# Patient Record
Sex: MALE | Race: WHITE | URBAN - METROPOLITAN AREA
[De-identification: names, ages, dates, MRNs, and addresses within clinical notes are randomized per-mention and may not be internally consistent; named-entity substitution may affect disease eponyms.]

---

## 2018-01-01 ENCOUNTER — ANESTHESIA EVENT (OUTPATIENT)
Dept: CARDIAC CATH/INVASIVE PROCEDURES | Age: 58
DRG: 252 | End: 2018-01-01
Payer: COMMERCIAL

## 2018-01-01 ENCOUNTER — OFFICE VISIT (OUTPATIENT)
Dept: PRIMARY CARE CLINIC | Age: 58
End: 2018-01-01
Payer: COMMERCIAL

## 2018-01-01 ENCOUNTER — ANESTHESIA EVENT (OUTPATIENT)
Dept: OPERATING ROOM | Age: 58
DRG: 252 | End: 2018-01-01
Payer: COMMERCIAL

## 2018-01-01 ENCOUNTER — APPOINTMENT (OUTPATIENT)
Dept: CT IMAGING | Age: 58
DRG: 252 | End: 2018-01-01
Payer: COMMERCIAL

## 2018-01-01 ENCOUNTER — APPOINTMENT (OUTPATIENT)
Dept: CARDIAC CATH/INVASIVE PROCEDURES | Age: 58
DRG: 252 | End: 2018-01-01
Payer: COMMERCIAL

## 2018-01-01 ENCOUNTER — APPOINTMENT (OUTPATIENT)
Dept: GENERAL RADIOLOGY | Age: 58
DRG: 252 | End: 2018-01-01
Payer: COMMERCIAL

## 2018-01-01 ENCOUNTER — HOSPITAL ENCOUNTER (INPATIENT)
Age: 58
LOS: 2 days | DRG: 252 | End: 2018-09-16
Attending: EMERGENCY MEDICINE | Admitting: INTERNAL MEDICINE
Payer: COMMERCIAL

## 2018-01-01 ENCOUNTER — APPOINTMENT (OUTPATIENT)
Dept: ULTRASOUND IMAGING | Age: 58
DRG: 252 | End: 2018-01-01
Payer: COMMERCIAL

## 2018-01-01 ENCOUNTER — ANESTHESIA (OUTPATIENT)
Dept: CARDIAC CATH/INVASIVE PROCEDURES | Age: 58
DRG: 252 | End: 2018-01-01
Payer: COMMERCIAL

## 2018-01-01 ENCOUNTER — ANESTHESIA (OUTPATIENT)
Dept: OPERATING ROOM | Age: 58
DRG: 252 | End: 2018-01-01
Payer: COMMERCIAL

## 2018-01-01 VITALS
HEIGHT: 72 IN | HEART RATE: 119 BPM | OXYGEN SATURATION: 77 % | TEMPERATURE: 99 F | RESPIRATION RATE: 19 BRPM | BODY MASS INDEX: 38.64 KG/M2 | WEIGHT: 285.27 LBS | DIASTOLIC BLOOD PRESSURE: 74 MMHG | SYSTOLIC BLOOD PRESSURE: 103 MMHG

## 2018-01-01 VITALS
OXYGEN SATURATION: 100 % | RESPIRATION RATE: 12 BRPM | DIASTOLIC BLOOD PRESSURE: 83 MMHG | TEMPERATURE: 98.9 F | SYSTOLIC BLOOD PRESSURE: 128 MMHG

## 2018-01-01 VITALS
DIASTOLIC BLOOD PRESSURE: 80 MMHG | WEIGHT: 269 LBS | BODY MASS INDEX: 36.44 KG/M2 | OXYGEN SATURATION: 96 % | HEART RATE: 82 BPM | SYSTOLIC BLOOD PRESSURE: 142 MMHG | HEIGHT: 72 IN

## 2018-01-01 VITALS — OXYGEN SATURATION: 97 % | DIASTOLIC BLOOD PRESSURE: 71 MMHG | TEMPERATURE: 98.5 F | SYSTOLIC BLOOD PRESSURE: 118 MMHG

## 2018-01-01 DIAGNOSIS — I99.8 ISCHEMIA OF LEFT LOWER EXTREMITY: Primary | ICD-10-CM

## 2018-01-01 DIAGNOSIS — I70.90 ARTERIAL OCCLUSION: ICD-10-CM

## 2018-01-01 DIAGNOSIS — M54.31 RIGHT SIDED SCIATICA: Primary | ICD-10-CM

## 2018-01-01 LAB
-: ABNORMAL
-: NORMAL
ABSOLUTE EOS #: 0 K/UL (ref 0–0.4)
ABSOLUTE EOS #: 0 K/UL (ref 0–0.4)
ABSOLUTE IMMATURE GRANULOCYTE: 0 K/UL (ref 0–0.3)
ABSOLUTE IMMATURE GRANULOCYTE: 0.81 K/UL (ref 0–0.3)
ABSOLUTE LYMPH #: 2.85 K/UL (ref 1–4.8)
ABSOLUTE LYMPH #: 3.65 K/UL (ref 1–4.8)
ABSOLUTE MONO #: 2.14 K/UL (ref 0.1–0.8)
ABSOLUTE MONO #: 4.46 K/UL (ref 0.1–0.8)
ACTION: NORMAL
ALBUMIN SERPL-MCNC: 1.3 G/DL (ref 3.5–5.2)
ALBUMIN SERPL-MCNC: 1.6 G/DL (ref 3.5–5.2)
ALBUMIN SERPL-MCNC: 1.7 G/DL (ref 3.5–5.2)
ALBUMIN SERPL-MCNC: 2.8 G/DL (ref 3.5–5.2)
ALBUMIN/GLOBULIN RATIO: 0.7 (ref 1–2.5)
ALBUMIN/GLOBULIN RATIO: 0.8 (ref 1–2.5)
ALBUMIN/GLOBULIN RATIO: 0.9 (ref 1–2.5)
ALBUMIN/GLOBULIN RATIO: 1.1 (ref 1–2.5)
ALLEN TEST: ABNORMAL
ALLEN TEST: NORMAL
ALLEN TEST: POSITIVE
ALP BLD-CCNC: 158 U/L (ref 40–129)
ALP BLD-CCNC: 49 U/L (ref 40–129)
ALP BLD-CCNC: 65 U/L (ref 40–129)
ALP BLD-CCNC: 68 U/L (ref 40–129)
ALT SERPL-CCNC: 4508 U/L (ref 5–41)
ALT SERPL-CCNC: 468 U/L (ref 5–41)
ALT SERPL-CCNC: 570 U/L (ref 5–41)
ALT SERPL-CCNC: 96 U/L (ref 5–41)
AMORPHOUS: ABNORMAL
AMORPHOUS: NORMAL
ANION GAP SERPL CALCULATED.3IONS-SCNC: 12 MMOL/L (ref 9–17)
ANION GAP SERPL CALCULATED.3IONS-SCNC: 13 MMOL/L (ref 9–17)
ANION GAP SERPL CALCULATED.3IONS-SCNC: 15 MMOL/L (ref 9–17)
ANION GAP SERPL CALCULATED.3IONS-SCNC: 16 MMOL/L (ref 9–17)
ANION GAP SERPL CALCULATED.3IONS-SCNC: 17 MMOL/L (ref 9–17)
ANION GAP SERPL CALCULATED.3IONS-SCNC: 21 MMOL/L (ref 9–17)
ANION GAP SERPL CALCULATED.3IONS-SCNC: 21 MMOL/L (ref 9–17)
ANION GAP SERPL CALCULATED.3IONS-SCNC: 22 MMOL/L (ref 9–17)
ANION GAP SERPL CALCULATED.3IONS-SCNC: 22 MMOL/L (ref 9–17)
ANION GAP SERPL CALCULATED.3IONS-SCNC: 26 MMOL/L (ref 9–17)
ANION GAP: 14 MMOL/L (ref 7–16)
ANION GAP: 14 MMOL/L (ref 7–16)
ANION GAP: 16 MMOL/L (ref 7–16)
ANION GAP: 17 MMOL/L (ref 7–16)
AST SERPL-CCNC: 1056 U/L
AST SERPL-CCNC: 1356 U/L
AST SERPL-CCNC: 52 U/L
AST SERPL-CCNC: >7000 U/L
BACTERIA: ABNORMAL
BACTERIA: NORMAL
BASOPHILS # BLD: 0 % (ref 0–2)
BASOPHILS # BLD: 1 % (ref 0–2)
BASOPHILS ABSOLUTE: 0 K/UL (ref 0–0.2)
BASOPHILS ABSOLUTE: 0.36 K/UL (ref 0–0.2)
BILIRUB SERPL-MCNC: 0.41 MG/DL (ref 0.3–1.2)
BILIRUB SERPL-MCNC: 0.91 MG/DL (ref 0.3–1.2)
BILIRUB SERPL-MCNC: 0.96 MG/DL (ref 0.3–1.2)
BILIRUB SERPL-MCNC: 1.41 MG/DL (ref 0.3–1.2)
BILIRUBIN DIRECT: 0.72 MG/DL
BILIRUBIN URINE: NEGATIVE
BILIRUBIN URINE: NEGATIVE
BILIRUBIN, INDIRECT: 0.24 MG/DL (ref 0–1)
BUN BLDV-MCNC: 20 MG/DL (ref 6–20)
BUN BLDV-MCNC: 22 MG/DL (ref 6–20)
BUN BLDV-MCNC: 23 MG/DL (ref 6–20)
BUN BLDV-MCNC: 30 MG/DL (ref 6–20)
BUN BLDV-MCNC: 34 MG/DL (ref 6–20)
BUN BLDV-MCNC: 35 MG/DL (ref 6–20)
BUN BLDV-MCNC: 38 MG/DL (ref 6–20)
BUN BLDV-MCNC: 38 MG/DL (ref 6–20)
BUN BLDV-MCNC: 40 MG/DL (ref 6–20)
BUN BLDV-MCNC: 40 MG/DL (ref 6–20)
BUN/CREAT BLD: ABNORMAL (ref 9–20)
CALCIUM IONIZED: 0.91 MMOL/L (ref 1.13–1.33)
CALCIUM IONIZED: 0.98 MMOL/L (ref 1.13–1.33)
CALCIUM IONIZED: 1 MMOL/L (ref 1.13–1.33)
CALCIUM IONIZED: 1 MMOL/L (ref 1.13–1.33)
CALCIUM IONIZED: 1.01 MMOL/L (ref 1.13–1.33)
CALCIUM IONIZED: 1.03 MMOL/L (ref 1.13–1.33)
CALCIUM IONIZED: 1.05 MMOL/L (ref 1.13–1.33)
CALCIUM IONIZED: 1.07 MMOL/L (ref 1.13–1.33)
CALCIUM SERPL-MCNC: 6.8 MG/DL (ref 8.6–10.4)
CALCIUM SERPL-MCNC: 6.9 MG/DL (ref 8.6–10.4)
CALCIUM SERPL-MCNC: 7 MG/DL (ref 8.6–10.4)
CALCIUM SERPL-MCNC: 7 MG/DL (ref 8.6–10.4)
CALCIUM SERPL-MCNC: 7.2 MG/DL (ref 8.6–10.4)
CALCIUM SERPL-MCNC: 7.6 MG/DL (ref 8.6–10.4)
CALCIUM SERPL-MCNC: 7.7 MG/DL (ref 8.6–10.4)
CALCIUM SERPL-MCNC: 8.6 MG/DL (ref 8.6–10.4)
CARBOXYHEMOGLOBIN: 0.9 % (ref 0–5)
CARBOXYHEMOGLOBIN: 1.1 % (ref 0–5)
CASTS UA: ABNORMAL /LPF (ref 0–8)
CASTS UA: NORMAL /LPF (ref 0–8)
CHLORIDE BLD-SCNC: 100 MMOL/L (ref 98–107)
CHLORIDE BLD-SCNC: 102 MMOL/L (ref 98–107)
CHLORIDE BLD-SCNC: 107 MMOL/L (ref 98–107)
CHLORIDE BLD-SCNC: 92 MMOL/L (ref 98–107)
CHLORIDE BLD-SCNC: 93 MMOL/L (ref 98–107)
CHLORIDE BLD-SCNC: 95 MMOL/L (ref 98–107)
CHLORIDE BLD-SCNC: 97 MMOL/L (ref 98–107)
CHLORIDE BLD-SCNC: 99 MMOL/L (ref 98–107)
CHLORIDE, WHOLE BLOOD: 115 MMOL/L (ref 98–110)
CO2: 15 MMOL/L (ref 20–31)
CO2: 16 MMOL/L (ref 20–31)
CO2: 18 MMOL/L (ref 20–31)
CO2: 19 MMOL/L (ref 20–31)
CO2: 20 MMOL/L (ref 20–31)
CO2: 21 MMOL/L (ref 20–31)
CO2: 24 MMOL/L (ref 20–31)
CO2: 25 MMOL/L (ref 20–31)
COLOR: ABNORMAL
COLOR: YELLOW
COMMENT UA: ABNORMAL
CORTISOL COLLECTION INFO: ABNORMAL
CORTISOL: 26.4 UG/DL (ref 2.7–18.4)
CREAT SERPL-MCNC: 0.74 MG/DL (ref 0.7–1.2)
CREAT SERPL-MCNC: 0.89 MG/DL (ref 0.7–1.2)
CREAT SERPL-MCNC: 1.03 MG/DL (ref 0.7–1.2)
CREAT SERPL-MCNC: 2.33 MG/DL (ref 0.7–1.2)
CREAT SERPL-MCNC: 3.34 MG/DL (ref 0.7–1.2)
CREAT SERPL-MCNC: 3.52 MG/DL (ref 0.7–1.2)
CREAT SERPL-MCNC: 3.67 MG/DL (ref 0.7–1.2)
CREAT SERPL-MCNC: 4.01 MG/DL (ref 0.7–1.2)
CREAT SERPL-MCNC: 4.12 MG/DL (ref 0.7–1.2)
CREAT SERPL-MCNC: 4.31 MG/DL (ref 0.7–1.2)
CRYSTALS, UA: ABNORMAL /HPF
CRYSTALS, UA: NORMAL /HPF
D-DIMER QUANTITATIVE: 3.59 MG/L FEU
DATE AND TIME: NORMAL
DIFFERENTIAL TYPE: ABNORMAL
DIFFERENTIAL TYPE: ABNORMAL
EKG ATRIAL RATE: 127 BPM
EKG P AXIS: 31 DEGREES
EKG P-R INTERVAL: 130 MS
EKG Q-T INTERVAL: 290 MS
EKG QRS DURATION: 78 MS
EKG QTC CALCULATION (BAZETT): 421 MS
EKG T AXIS: 15 DEGREES
EKG VENTRICULAR RATE: 127 BPM
EOSINOPHILS RELATIVE PERCENT: 0 % (ref 1–4)
EOSINOPHILS RELATIVE PERCENT: 0 % (ref 1–4)
EPITHELIAL CELLS UA: ABNORMAL /HPF (ref 0–5)
EPITHELIAL CELLS UA: NORMAL /HPF (ref 0–5)
FIBRINOGEN: <80 MG/DL (ref 140–420)
FIBRINOGEN: <80 MG/DL (ref 140–420)
FIO2: 30
FIO2: 50
FIO2: 60
FIO2: ABNORMAL
GFR AFRICAN AMERICAN: 17 ML/MIN
GFR AFRICAN AMERICAN: 18 ML/MIN
GFR AFRICAN AMERICAN: 19 ML/MIN
GFR AFRICAN AMERICAN: 21 ML/MIN
GFR AFRICAN AMERICAN: 22 ML/MIN
GFR AFRICAN AMERICAN: 23 ML/MIN
GFR AFRICAN AMERICAN: 35 ML/MIN
GFR AFRICAN AMERICAN: >60 ML/MIN
GFR NON-AFRICAN AMERICAN: 12 ML/MIN
GFR NON-AFRICAN AMERICAN: 14 ML/MIN
GFR NON-AFRICAN AMERICAN: 15 ML/MIN
GFR NON-AFRICAN AMERICAN: 15 ML/MIN
GFR NON-AFRICAN AMERICAN: 17 ML/MIN
GFR NON-AFRICAN AMERICAN: 18 ML/MIN
GFR NON-AFRICAN AMERICAN: 19 ML/MIN
GFR NON-AFRICAN AMERICAN: 29 ML/MIN
GFR NON-AFRICAN AMERICAN: >60 ML/MIN
GFR NON-AFRICAN AMERICAN: NORMAL ML/MIN
GFR SERPL CREATININE-BSD FRML MDRD: 15 ML/MIN
GFR SERPL CREATININE-BSD FRML MDRD: >60 ML/MIN
GFR SERPL CREATININE-BSD FRML MDRD: ABNORMAL ML/MIN/{1.73_M2}
GFR SERPL CREATININE-BSD FRML MDRD: NORMAL ML/MIN
GFR SERPL CREATININE-BSD FRML MDRD: NORMAL ML/MIN/{1.73_M2}
GFR SERPL CREATININE-BSD FRML MDRD: NORMAL ML/MIN/{1.73_M2}
GLOBULIN: ABNORMAL G/DL (ref 1.5–3.8)
GLUCOSE BLD-MCNC: 102 MG/DL (ref 75–110)
GLUCOSE BLD-MCNC: 118 MG/DL (ref 75–110)
GLUCOSE BLD-MCNC: 119 MG/DL (ref 75–110)
GLUCOSE BLD-MCNC: 132 MG/DL (ref 75–110)
GLUCOSE BLD-MCNC: 140 MG/DL (ref 70–99)
GLUCOSE BLD-MCNC: 142 MG/DL (ref 70–99)
GLUCOSE BLD-MCNC: 143 MG/DL (ref 74–100)
GLUCOSE BLD-MCNC: 145 MG/DL (ref 75–110)
GLUCOSE BLD-MCNC: 148 MG/DL (ref 75–110)
GLUCOSE BLD-MCNC: 152 MG/DL (ref 70–99)
GLUCOSE BLD-MCNC: 153 MG/DL (ref 70–99)
GLUCOSE BLD-MCNC: 154 MG/DL (ref 75–110)
GLUCOSE BLD-MCNC: 158 MG/DL (ref 75–110)
GLUCOSE BLD-MCNC: 160 MG/DL (ref 70–99)
GLUCOSE BLD-MCNC: 161 MG/DL (ref 75–110)
GLUCOSE BLD-MCNC: 174 MG/DL (ref 75–110)
GLUCOSE BLD-MCNC: 176 MG/DL (ref 75–110)
GLUCOSE BLD-MCNC: 177 MG/DL (ref 70–99)
GLUCOSE BLD-MCNC: 180 MG/DL (ref 70–99)
GLUCOSE BLD-MCNC: 180 MG/DL (ref 75–110)
GLUCOSE BLD-MCNC: 180 MG/DL (ref 75–110)
GLUCOSE BLD-MCNC: 181 MG/DL (ref 74–100)
GLUCOSE BLD-MCNC: 182 MG/DL (ref 75–110)
GLUCOSE BLD-MCNC: 186 MG/DL (ref 70–99)
GLUCOSE BLD-MCNC: 189 MG/DL (ref 75–110)
GLUCOSE BLD-MCNC: 191 MG/DL (ref 75–110)
GLUCOSE BLD-MCNC: 197 MG/DL (ref 75–110)
GLUCOSE BLD-MCNC: 199 MG/DL (ref 74–100)
GLUCOSE BLD-MCNC: 208 MG/DL (ref 70–99)
GLUCOSE BLD-MCNC: 209 MG/DL (ref 75–110)
GLUCOSE BLD-MCNC: 218 MG/DL (ref 70–99)
GLUCOSE BLD-MCNC: 231 MG/DL (ref 74–100)
GLUCOSE BLD-MCNC: 257 MG/DL (ref 74–100)
GLUCOSE BLD-MCNC: 36 MG/DL (ref 75–110)
GLUCOSE BLD-MCNC: 57 MG/DL (ref 75–110)
GLUCOSE BLD-MCNC: 67 MG/DL (ref 75–110)
GLUCOSE BLD-MCNC: 85 MG/DL (ref 75–110)
GLUCOSE URINE: ABNORMAL
GLUCOSE URINE: NEGATIVE
HAPTOGLOBIN: 125 MG/DL (ref 30–200)
HCO3 ARTERIAL: 20.4 MMOL/L (ref 22–27)
HCO3 ARTERIAL: 20.5 MMOL/L (ref 22–27)
HCT VFR BLD CALC: 18.8 % (ref 40.7–50.3)
HCT VFR BLD CALC: 26.7 % (ref 40.7–50.3)
HCT VFR BLD CALC: 31.9 % (ref 40.7–50.3)
HCT VFR BLD CALC: 34.3 % (ref 40.7–50.3)
HCT VFR BLD CALC: 37.8 %
HCT VFR BLD CALC: 41.2 % (ref 40.7–50.3)
HCT VFR BLD CALC: 42.9 % (ref 40.7–50.3)
HCT VFR BLD CALC: 42.9 % (ref 40.7–50.3)
HCT VFR BLD CALC: 45.6 % (ref 40.7–50.3)
HCT VFR BLD CALC: 53.8 % (ref 40.7–50.3)
HEMOGLOBIN: 10.3 G/DL (ref 13–17)
HEMOGLOBIN: 11.1 G/DL (ref 13–17)
HEMOGLOBIN: 12.3 GM/DL
HEMOGLOBIN: 13.3 G/DL (ref 13–17)
HEMOGLOBIN: 13.9 G/DL (ref 13–17)
HEMOGLOBIN: 14.3 G/DL (ref 13–17)
HEMOGLOBIN: 15.4 G/DL (ref 13–17)
HEMOGLOBIN: 17.6 G/DL (ref 13–17)
HEMOGLOBIN: 5.9 G/DL (ref 13–17)
HEMOGLOBIN: 8.5 G/DL (ref 13–17)
HEPARIN INDUCED PLATELET ANTIBODY: 0.24 O.D. (ref 0–0.4)
IMMATURE GRANULOCYTES: 0 %
IMMATURE GRANULOCYTES: 2 %
INR BLD: 1.2
INR BLD: 13.3
INR BLD: >16.9
KETONES, URINE: NEGATIVE
KETONES, URINE: NEGATIVE
LACTIC ACID, WHOLE BLOOD: 10.4 MMOL/L (ref 0.7–2.1)
LACTIC ACID, WHOLE BLOOD: 8.7 MMOL/L (ref 0.7–2.1)
LACTIC ACID: ABNORMAL MMOL/L
LEUKOCYTE ESTERASE, URINE: ABNORMAL
LEUKOCYTE ESTERASE, URINE: NEGATIVE
LV EF: 38 %
LVEF MODALITY: NORMAL
LYMPHOCYTES # BLD: 8 % (ref 24–44)
LYMPHOCYTES # BLD: 9 % (ref 24–44)
MAGNESIUM: 1.8 MG/DL (ref 1.6–2.6)
MAGNESIUM: 2.1 MG/DL (ref 1.6–2.6)
MAGNESIUM: 2.4 MG/DL (ref 1.6–2.6)
MAGNESIUM: 2.4 MG/DL (ref 1.6–2.6)
MCH RBC QN AUTO: 29.1 PG (ref 25.2–33.5)
MCH RBC QN AUTO: 29.4 PG (ref 25.2–33.5)
MCH RBC QN AUTO: 29.6 PG (ref 25.2–33.5)
MCH RBC QN AUTO: 29.8 PG (ref 25.2–33.5)
MCH RBC QN AUTO: 29.9 PG (ref 25.2–33.5)
MCH RBC QN AUTO: 30 PG (ref 25.2–33.5)
MCH RBC QN AUTO: 30.2 PG (ref 25.2–33.5)
MCHC RBC AUTO-ENTMCNC: 31.8 G/DL (ref 28.4–34.8)
MCHC RBC AUTO-ENTMCNC: 32.3 G/DL (ref 28.4–34.8)
MCHC RBC AUTO-ENTMCNC: 32.3 G/DL (ref 28.4–34.8)
MCHC RBC AUTO-ENTMCNC: 32.4 G/DL (ref 28.4–34.8)
MCHC RBC AUTO-ENTMCNC: 32.4 G/DL (ref 28.4–34.8)
MCHC RBC AUTO-ENTMCNC: 32.7 G/DL (ref 28.4–34.8)
MCHC RBC AUTO-ENTMCNC: 33.8 G/DL (ref 28.4–34.8)
MCV RBC AUTO: 88.2 FL (ref 82.6–102.9)
MCV RBC AUTO: 88.9 FL (ref 82.6–102.9)
MCV RBC AUTO: 90.9 FL (ref 82.6–102.9)
MCV RBC AUTO: 91.3 FL (ref 82.6–102.9)
MCV RBC AUTO: 92.5 FL (ref 82.6–102.9)
MCV RBC AUTO: 93.5 FL (ref 82.6–102.9)
MCV RBC AUTO: 94.3 FL (ref 82.6–102.9)
METHEMOGLOBIN: ABNORMAL % (ref 0–1.5)
METHEMOGLOBIN: ABNORMAL % (ref 0–1.5)
MODE: ABNORMAL
MODE: NORMAL
MONOCYTES # BLD: 11 % (ref 1–7)
MONOCYTES # BLD: 6 % (ref 1–7)
MORPHOLOGY: NORMAL
MORPHOLOGY: NORMAL
MUCUS: ABNORMAL
MUCUS: NORMAL
MYOGLOBIN: 705 NG/ML (ref 28–72)
MYOGLOBIN: ABNORMAL NG/ML (ref 28–72)
NEGATIVE BASE EXCESS, ART: 10 (ref 0–2)
NEGATIVE BASE EXCESS, ART: 12 (ref 0–2)
NEGATIVE BASE EXCESS, ART: 12 (ref 0–2)
NEGATIVE BASE EXCESS, ART: 13 (ref 0–2)
NEGATIVE BASE EXCESS, ART: 3.7 MMOL/L (ref 0–2)
NEGATIVE BASE EXCESS, ART: 4 (ref 0–2)
NEGATIVE BASE EXCESS, ART: 5.1 MMOL/L (ref 0–2)
NEGATIVE BASE EXCESS, ART: 6 (ref 0–2)
NEGATIVE BASE EXCESS, ART: 7 (ref 0–2)
NEGATIVE BASE EXCESS, ART: 7 (ref 0–2)
NEGATIVE BASE EXCESS, ART: NORMAL (ref 0–2)
NITRITE, URINE: NEGATIVE
NITRITE, URINE: NEGATIVE
NOTIFICATION TIME: ABNORMAL
NOTIFICATION TIME: ABNORMAL
NOTIFICATION: ABNORMAL
NOTIFICATION: ABNORMAL
NOTIFY: NORMAL
NRBC AUTOMATED: 0 PER 100 WBC
NRBC AUTOMATED: 0.1 PER 100 WBC
NRBC AUTOMATED: 0.3 PER 100 WBC
NRBC AUTOMATED: 1.4 PER 100 WBC
NRBC AUTOMATED: 5.1 PER 100 WBC
O2 DEVICE/FLOW/%: ABNORMAL
O2 DEVICE/FLOW/%: NORMAL
O2 SAT, ARTERIAL: 97.6 % (ref 94–100)
O2 SAT, ARTERIAL: 98.1 % (ref 94–100)
OTHER OBSERVATIONS UA: ABNORMAL
OTHER OBSERVATIONS UA: NORMAL
OXYHEMOGLOBIN: ABNORMAL % (ref 95–98)
OXYHEMOGLOBIN: ABNORMAL % (ref 95–98)
PARTIAL THROMBOPLASTIN TIME: 70.3 SEC (ref 20.5–30.5)
PARTIAL THROMBOPLASTIN TIME: 79.4 SEC (ref 20.5–30.5)
PARTIAL THROMBOPLASTIN TIME: 92.4 SEC (ref 20.5–30.5)
PARTIAL THROMBOPLASTIN TIME: 97.6 SEC (ref 20.5–30.5)
PARTIAL THROMBOPLASTIN TIME: >120 SEC (ref 20.5–30.5)
PATIENT TEMP: 37
PATIENT TEMP: 37
PATIENT TEMP: ABNORMAL
PATIENT TEMP: NORMAL
PCO2 ARTERIAL: 35.5 MMHG (ref 32–45)
PCO2 ARTERIAL: 42.1 MMHG (ref 32–45)
PCO2, ART, TEMP ADJ: ABNORMAL (ref 32–45)
PCO2, ART, TEMP ADJ: ABNORMAL (ref 32–45)
PDW BLD-RTO: 13.7 % (ref 11.8–14.4)
PDW BLD-RTO: 13.9 % (ref 11.8–14.4)
PDW BLD-RTO: 14 % (ref 11.8–14.4)
PDW BLD-RTO: 14.1 % (ref 11.8–14.4)
PDW BLD-RTO: 14.2 % (ref 11.8–14.4)
PEEP/CPAP: ABNORMAL
PEEP/CPAP: ABNORMAL
PH ARTERIAL: 7.31 (ref 7.35–7.45)
PH ARTERIAL: 7.38 (ref 7.35–7.45)
PH UA: 5 (ref 5–8)
PH UA: 5.5 (ref 5–8)
PH, ART, TEMP ADJ: ABNORMAL (ref 7.35–7.45)
PH, ART, TEMP ADJ: ABNORMAL (ref 7.35–7.45)
PHOSPHORUS: 10.6 MG/DL (ref 2.5–4.5)
PHOSPHORUS: 11.5 MG/DL (ref 2.5–4.5)
PHOSPHORUS: 12.9 MG/DL (ref 2.5–4.5)
PHOSPHORUS: 3.9 MG/DL (ref 2.5–4.5)
PHOSPHORUS: 6.9 MG/DL (ref 2.5–4.5)
PLATELET # BLD: 238 K/UL (ref 138–453)
PLATELET # BLD: 286 K/UL (ref 138–453)
PLATELET # BLD: 313 K/UL (ref 138–453)
PLATELET # BLD: 378 K/UL (ref 138–453)
PLATELET # BLD: 435 K/UL (ref 138–453)
PLATELET # BLD: 440 K/UL (ref 138–453)
PLATELET # BLD: 465 K/UL (ref 138–453)
PLATELET ESTIMATE: ABNORMAL
PLATELET ESTIMATE: ABNORMAL
PMV BLD AUTO: 11.3 FL (ref 8.1–13.5)
PMV BLD AUTO: 11.6 FL (ref 8.1–13.5)
PMV BLD AUTO: 12.1 FL (ref 8.1–13.5)
PMV BLD AUTO: 12.2 FL (ref 8.1–13.5)
PMV BLD AUTO: 12.3 FL (ref 8.1–13.5)
PO2 ARTERIAL: 105 MMHG (ref 75–95)
PO2 ARTERIAL: 126 MMHG (ref 75–95)
PO2, ART, TEMP ADJ: ABNORMAL MMHG (ref 75–95)
PO2, ART, TEMP ADJ: ABNORMAL MMHG (ref 75–95)
POC CHLORIDE: 104 MMOL/L (ref 98–107)
POC CHLORIDE: 105 MMOL/L (ref 98–107)
POC CHLORIDE: 105 MMOL/L (ref 98–107)
POC CHLORIDE: 106 MMOL/L (ref 98–107)
POC CREATININE: 0.87 MG/DL (ref 0.51–1.19)
POC CREATININE: 4.97 MG/DL (ref 0.51–1.19)
POC CREATININE: NORMAL MG/DL (ref 0.51–1.19)
POC HCO3: 14.6 MMOL/L (ref 21–28)
POC HCO3: 15.6 MMOL/L (ref 21–28)
POC HCO3: 15.8 MMOL/L (ref 21–28)
POC HCO3: 17.1 MMOL/L (ref 21–28)
POC HCO3: 18.3 MMOL/L (ref 21–28)
POC HCO3: 19.9 MMOL/L (ref 21–28)
POC HCO3: 20.2 MMOL/L (ref 21–28)
POC HCO3: 21.7 MMOL/L (ref 21–28)
POC HCO3: 22.1 MMOL/L (ref 21–28)
POC HCO3: 22.8 MMOL/L (ref 21–28)
POC HCO3: 25.4 MMOL/L (ref 21–28)
POC HEMATOCRIT: 29 % (ref 41–53)
POC HEMATOCRIT: 32 % (ref 41–53)
POC HEMATOCRIT: 47 % (ref 41–53)
POC HEMATOCRIT: 47 % (ref 41–53)
POC HEMOGLOBIN: 11 G/DL (ref 13.5–17.5)
POC HEMOGLOBIN: 15.9 G/DL (ref 13.5–17.5)
POC HEMOGLOBIN: 16 G/DL (ref 13.5–17.5)
POC HEMOGLOBIN: 9.9 G/DL (ref 13.5–17.5)
POC IONIZED CALCIUM: 1 MMOL/L (ref 1.15–1.33)
POC IONIZED CALCIUM: 1.04 MMOL/L (ref 1.15–1.33)
POC IONIZED CALCIUM: 1.12 MMOL/L (ref 1.15–1.33)
POC IONIZED CALCIUM: 1.12 MMOL/L (ref 1.15–1.33)
POC IONIZED CALCIUM: 1.13 MMOL/L (ref 1.15–1.33)
POC LACTIC ACID: 4.4 MMOL/L (ref 0.56–1.39)
POC LACTIC ACID: 6.65 MMOL/L (ref 0.56–1.39)
POC LACTIC ACID: 8.28 MMOL/L (ref 0.56–1.39)
POC LACTIC ACID: 9.07 MMOL/L (ref 0.56–1.39)
POC O2 SATURATION: 89 % (ref 94–98)
POC O2 SATURATION: 90 % (ref 94–98)
POC O2 SATURATION: 94 % (ref 94–98)
POC O2 SATURATION: 95 % (ref 94–98)
POC O2 SATURATION: 96 % (ref 94–98)
POC O2 SATURATION: 97 % (ref 94–98)
POC O2 SATURATION: 98 % (ref 94–98)
POC O2 SATURATION: 98 % (ref 94–98)
POC PCO2 TEMP: ABNORMAL MM HG
POC PCO2 TEMP: NORMAL MM HG
POC PCO2: 32 MM HG (ref 35–48)
POC PCO2: 36.1 MM HG (ref 35–48)
POC PCO2: 39.2 MM HG (ref 35–48)
POC PCO2: 39.8 MM HG (ref 35–48)
POC PCO2: 40.7 MM HG (ref 35–48)
POC PCO2: 40.8 MM HG (ref 35–48)
POC PCO2: 40.9 MM HG (ref 35–48)
POC PCO2: 41.2 MM HG (ref 35–48)
POC PCO2: 45.7 MM HG (ref 35–48)
POC PCO2: 49.9 MM HG (ref 35–48)
POC PCO2: 53.1 MM HG (ref 35–48)
POC PH TEMP: ABNORMAL
POC PH TEMP: NORMAL
POC PH: 7.17 (ref 7.35–7.45)
POC PH: 7.2 (ref 7.35–7.45)
POC PH: 7.21 (ref 7.35–7.45)
POC PH: 7.23 (ref 7.35–7.45)
POC PH: 7.23 (ref 7.35–7.45)
POC PH: 7.25 (ref 7.35–7.45)
POC PH: 7.27 (ref 7.35–7.45)
POC PH: 7.32 (ref 7.35–7.45)
POC PH: 7.33 (ref 7.35–7.45)
POC PH: 7.4 (ref 7.35–7.45)
POC PH: 7.4 (ref 7.35–7.45)
POC PO2 TEMP: ABNORMAL MM HG
POC PO2 TEMP: NORMAL MM HG
POC PO2: 100.8 MM HG (ref 83–108)
POC PO2: 106.2 MM HG (ref 83–108)
POC PO2: 127.5 MM HG (ref 83–108)
POC PO2: 61.1 MM HG (ref 83–108)
POC PO2: 69.2 MM HG (ref 83–108)
POC PO2: 84 MM HG (ref 83–108)
POC PO2: 85.8 MM HG (ref 83–108)
POC PO2: 87 MM HG (ref 83–108)
POC PO2: 90.2 MM HG (ref 83–108)
POC PO2: 95.6 MM HG (ref 83–108)
POC PO2: 95.8 MM HG (ref 83–108)
POC POTASSIUM: 4.6 MMOL/L (ref 3.5–4.5)
POC POTASSIUM: 4.8 MMOL/L (ref 3.5–4.5)
POC POTASSIUM: 4.9 MMOL/L (ref 3.5–4.5)
POC POTASSIUM: 5 MMOL/L (ref 3.5–4.5)
POC POTASSIUM: 5.3 MMOL/L (ref 3.5–4.5)
POC SODIUM: 136 MMOL/L (ref 138–146)
POC SODIUM: 139 MMOL/L (ref 138–146)
POC SODIUM: 139 MMOL/L (ref 138–146)
POC SODIUM: 141 MMOL/L (ref 138–146)
POSITIVE BASE EXCESS, ART: 0 (ref 0–3)
POSITIVE BASE EXCESS, ART: ABNORMAL (ref 0–3)
POSITIVE BASE EXCESS, ART: ABNORMAL MMOL/L (ref 0–2)
POSITIVE BASE EXCESS, ART: ABNORMAL MMOL/L (ref 0–2)
POTASSIUM SERPL-SCNC: 4.1 MMOL/L (ref 3.7–5.3)
POTASSIUM SERPL-SCNC: 4.7 MMOL/L (ref 3.7–5.3)
POTASSIUM SERPL-SCNC: 5.1 MMOL/L (ref 3.7–5.3)
POTASSIUM SERPL-SCNC: 5.1 MMOL/L (ref 3.7–5.3)
POTASSIUM SERPL-SCNC: 5.4 MMOL/L (ref 3.7–5.3)
POTASSIUM SERPL-SCNC: 5.7 MMOL/L (ref 3.7–5.3)
POTASSIUM SERPL-SCNC: 5.7 MMOL/L (ref 3.7–5.3)
POTASSIUM SERPL-SCNC: 6.7 MMOL/L (ref 3.7–5.3)
POTASSIUM SERPL-SCNC: 6.7 MMOL/L (ref 3.7–5.3)
POTASSIUM SERPL-SCNC: 6.9 MMOL/L (ref 3.7–5.3)
POTASSIUM, WHOLE BLOOD: 3.3 MMOL/L (ref 3.6–5)
POTASSIUM, WHOLE BLOOD: 3.7 MMOL/L (ref 3.6–5)
PROTEIN UA: ABNORMAL
PROTEIN UA: ABNORMAL
PROTHROMBIN TIME: 12.7 SEC (ref 9–12)
PROTHROMBIN TIME: 120.5 SEC (ref 9–12)
PROTHROMBIN TIME: >150 SEC (ref 9–12)
PSV: ABNORMAL
PSV: ABNORMAL
PT. POSITION: ABNORMAL
PT. POSITION: ABNORMAL
RBC # BLD: 2.83 M/UL (ref 4.21–5.77)
RBC # BLD: 3.41 M/UL (ref 4.21–5.77)
RBC # BLD: 3.71 M/UL (ref 4.21–5.77)
RBC # BLD: 4.53 M/UL (ref 4.21–5.77)
RBC # BLD: 4.7 M/UL (ref 4.21–5.77)
RBC # BLD: 5.17 M/UL (ref 4.21–5.77)
RBC # BLD: 6.05 M/UL (ref 4.21–5.77)
RBC # BLD: ABNORMAL 10*6/UL
RBC # BLD: ABNORMAL 10*6/UL
RBC UA: ABNORMAL /HPF (ref 0–4)
RBC UA: NORMAL /HPF (ref 0–4)
READ BACK: NO
READ BACK: YES
READ BACK: YES
RENAL EPITHELIAL, UA: ABNORMAL /HPF
RENAL EPITHELIAL, UA: NORMAL /HPF
RESPIRATORY RATE: ABNORMAL
RESPIRATORY RATE: ABNORMAL
SAMPLE SITE: ABNORMAL
SAMPLE SITE: NORMAL
SEG NEUTROPHILS: 78 % (ref 36–66)
SEG NEUTROPHILS: 85 % (ref 36–66)
SEGMENTED NEUTROPHILS ABSOLUTE COUNT: 30.25 K/UL (ref 1.8–7.7)
SEGMENTED NEUTROPHILS ABSOLUTE COUNT: 31.58 K/UL (ref 1.8–7.7)
SET RATE: ABNORMAL
SET RATE: ABNORMAL
SODIUM BLD-SCNC: 130 MMOL/L (ref 135–144)
SODIUM BLD-SCNC: 134 MMOL/L (ref 135–144)
SODIUM BLD-SCNC: 136 MMOL/L (ref 135–144)
SODIUM BLD-SCNC: 137 MMOL/L (ref 135–144)
SODIUM BLD-SCNC: 137 MMOL/L (ref 135–144)
SODIUM BLD-SCNC: 138 MMOL/L (ref 135–144)
SODIUM BLD-SCNC: 139 MMOL/L (ref 135–144)
SODIUM BLD-SCNC: 140 MMOL/L (ref 135–144)
SODIUM BLD-SCNC: 141 MMOL/L (ref 135–144)
SODIUM BLD-SCNC: 142 MMOL/L (ref 135–144)
SODIUM, WHOLE BLOOD: 137 MMOL/L (ref 136–145)
SPECIFIC GRAVITY UA: 1.03 (ref 1–1.03)
SPECIFIC GRAVITY UA: 1.07 (ref 1–1.03)
TCO2 (CALC), ART: 16 MMOL/L (ref 22–29)
TCO2 (CALC), ART: 17 MMOL/L (ref 22–29)
TCO2 (CALC), ART: 17 MMOL/L (ref 22–29)
TCO2 (CALC), ART: 18 MMOL/L (ref 22–29)
TCO2 (CALC), ART: 20 MMOL/L (ref 22–29)
TCO2 (CALC), ART: 21 MMOL/L (ref 22–29)
TCO2 (CALC), ART: 22 MMOL/L (ref 22–29)
TCO2 (CALC), ART: 23 MMOL/L (ref 22–29)
TCO2 (CALC), ART: 24 MMOL/L (ref 22–29)
TCO2 (CALC), ART: 24 MMOL/L (ref 22–29)
TCO2 (CALC), ART: 27 MMOL/L (ref 22–29)
TEXT FOR RESPIRATORY: ABNORMAL
TEXT FOR RESPIRATORY: ABNORMAL
TOTAL CK: 478 U/L (ref 39–308)
TOTAL CK: 8687 U/L (ref 39–308)
TOTAL CK: ABNORMAL U/L (ref 39–308)
TOTAL HB: ABNORMAL G/DL (ref 12–16)
TOTAL HB: ABNORMAL G/DL (ref 12–16)
TOTAL PROTEIN: 3.1 G/DL (ref 6.4–8.3)
TOTAL PROTEIN: 3.7 G/DL (ref 6.4–8.3)
TOTAL PROTEIN: 3.7 G/DL (ref 6.4–8.3)
TOTAL PROTEIN: 5.4 G/DL (ref 6.4–8.3)
TOTAL RATE: ABNORMAL
TOTAL RATE: ABNORMAL
TRICHOMONAS: ABNORMAL
TRICHOMONAS: NORMAL
TROPONIN INTERP: ABNORMAL
TROPONIN T: 0.13 NG/ML
TROPONIN T: 0.19 NG/ML
TROPONIN T: 0.2 NG/ML
TURBIDITY: ABNORMAL
TURBIDITY: CLEAR
URINE HGB: ABNORMAL
URINE HGB: NEGATIVE
UROBILINOGEN, URINE: NORMAL
UROBILINOGEN, URINE: NORMAL
VT: ABNORMAL
VT: ABNORMAL
WBC # BLD: 23.2 K/UL (ref 3.5–11.3)
WBC # BLD: 29.3 K/UL (ref 3.5–11.3)
WBC # BLD: 35.6 K/UL (ref 3.5–11.3)
WBC # BLD: 39 K/UL (ref 3.5–11.3)
WBC # BLD: 40 K/UL (ref 3.5–11.3)
WBC # BLD: 40.5 K/UL (ref 3.5–11.3)
WBC # BLD: 40.6 K/UL (ref 3.5–11.3)
WBC # BLD: ABNORMAL 10*3/UL
WBC # BLD: ABNORMAL 10*3/UL
WBC UA: ABNORMAL /HPF (ref 0–5)
WBC UA: NORMAL /HPF (ref 0–5)
YEAST: ABNORMAL
YEAST: NORMAL

## 2018-01-01 PROCEDURE — 99291 CRITICAL CARE FIRST HOUR: CPT | Performed by: INTERNAL MEDICINE

## 2018-01-01 PROCEDURE — 5A1945Z RESPIRATORY VENTILATION, 24-96 CONSECUTIVE HOURS: ICD-10-PCS | Performed by: INTERNAL MEDICINE

## 2018-01-01 PROCEDURE — C1887 CATHETER, GUIDING: HCPCS

## 2018-01-01 PROCEDURE — 6370000000 HC RX 637 (ALT 250 FOR IP): Performed by: STUDENT IN AN ORGANIZED HEALTH CARE EDUCATION/TRAINING PROGRAM

## 2018-01-01 PROCEDURE — 85610 PROTHROMBIN TIME: CPT

## 2018-01-01 PROCEDURE — 85730 THROMBOPLASTIN TIME PARTIAL: CPT

## 2018-01-01 PROCEDURE — 83605 ASSAY OF LACTIC ACID: CPT

## 2018-01-01 PROCEDURE — 2709999900 HC NON-CHARGEABLE SUPPLY

## 2018-01-01 PROCEDURE — 6370000000 HC RX 637 (ALT 250 FOR IP): Performed by: SURGERY

## 2018-01-01 PROCEDURE — 82947 ASSAY GLUCOSE BLOOD QUANT: CPT

## 2018-01-01 PROCEDURE — 2500000003 HC RX 250 WO HCPCS: Performed by: SURGERY

## 2018-01-01 PROCEDURE — 6360000004 HC RX CONTRAST MEDICATION: Performed by: SURGERY

## 2018-01-01 PROCEDURE — 2580000003 HC RX 258: Performed by: INTERNAL MEDICINE

## 2018-01-01 PROCEDURE — 36430 TRANSFUSION BLD/BLD COMPNT: CPT

## 2018-01-01 PROCEDURE — 85018 HEMOGLOBIN: CPT

## 2018-01-01 PROCEDURE — 6360000002 HC RX W HCPCS: Performed by: NURSE ANESTHETIST, CERTIFIED REGISTERED

## 2018-01-01 PROCEDURE — 83874 ASSAY OF MYOGLOBIN: CPT

## 2018-01-01 PROCEDURE — C1894 INTRO/SHEATH, NON-LASER: HCPCS | Performed by: SURGERY

## 2018-01-01 PROCEDURE — 6360000002 HC RX W HCPCS: Performed by: STUDENT IN AN ORGANIZED HEALTH CARE EDUCATION/TRAINING PROGRAM

## 2018-01-01 PROCEDURE — P9040 RBC LEUKOREDUCED IRRADIATED: HCPCS

## 2018-01-01 PROCEDURE — 75635 CT ANGIO ABDOMINAL ARTERIES: CPT

## 2018-01-01 PROCEDURE — 99255 IP/OBS CONSLTJ NEW/EST HI 80: CPT | Performed by: SURGERY

## 2018-01-01 PROCEDURE — 02HV33Z INSERTION OF INFUSION DEVICE INTO SUPERIOR VENA CAVA, PERCUTANEOUS APPROACH: ICD-10-PCS | Performed by: SURGERY

## 2018-01-01 PROCEDURE — C9113 INJ PANTOPRAZOLE SODIUM, VIA: HCPCS | Performed by: SURGERY

## 2018-01-01 PROCEDURE — 2580000003 HC RX 258: Performed by: FAMILY MEDICINE

## 2018-01-01 PROCEDURE — 83735 ASSAY OF MAGNESIUM: CPT

## 2018-01-01 PROCEDURE — 2580000003 HC RX 258: Performed by: SURGERY

## 2018-01-01 PROCEDURE — 36415 COLL VENOUS BLD VENIPUNCTURE: CPT

## 2018-01-01 PROCEDURE — 71045 X-RAY EXAM CHEST 1 VIEW: CPT

## 2018-01-01 PROCEDURE — 6370000000 HC RX 637 (ALT 250 FOR IP): Performed by: FAMILY MEDICINE

## 2018-01-01 PROCEDURE — 04CK0ZZ EXTIRPATION OF MATTER FROM RIGHT FEMORAL ARTERY, OPEN APPROACH: ICD-10-PCS | Performed by: SURGERY

## 2018-01-01 PROCEDURE — 2580000003 HC RX 258: Performed by: ANESTHESIOLOGY

## 2018-01-01 PROCEDURE — 2580000003 HC RX 258: Performed by: STUDENT IN AN ORGANIZED HEALTH CARE EDUCATION/TRAINING PROGRAM

## 2018-01-01 PROCEDURE — 2500000003 HC RX 250 WO HCPCS: Performed by: STUDENT IN AN ORGANIZED HEALTH CARE EDUCATION/TRAINING PROGRAM

## 2018-01-01 PROCEDURE — B41BZZZ FLUOROSCOPY OF OTHER INTRA-ABDOMINAL ARTERIES: ICD-10-PCS | Performed by: SURGERY

## 2018-01-01 PROCEDURE — 3609008400 HC SIGMOIDOSCOPY DIAGNOSTIC: Performed by: SURGERY

## 2018-01-01 PROCEDURE — 3E05017 INTRODUCTION OF OTHER THROMBOLYTIC INTO PERIPHERAL ARTERY, OPEN APPROACH: ICD-10-PCS | Performed by: SURGERY

## 2018-01-01 PROCEDURE — 99202 OFFICE O/P NEW SF 15 MIN: CPT | Performed by: FAMILY MEDICINE

## 2018-01-01 PROCEDURE — 82803 BLOOD GASES ANY COMBINATION: CPT

## 2018-01-01 PROCEDURE — 99285 EMERGENCY DEPT VISIT HI MDM: CPT

## 2018-01-01 PROCEDURE — 99255 IP/OBS CONSLTJ NEW/EST HI 80: CPT | Performed by: INTERNAL MEDICINE

## 2018-01-01 PROCEDURE — 2580000003 HC RX 258: Performed by: NURSE ANESTHETIST, CERTIFIED REGISTERED

## 2018-01-01 PROCEDURE — C1894 INTRO/SHEATH, NON-LASER: HCPCS

## 2018-01-01 PROCEDURE — 0KNT0ZZ RELEASE LEFT LOWER LEG MUSCLE, OPEN APPROACH: ICD-10-PCS | Performed by: SURGERY

## 2018-01-01 PROCEDURE — 80053 COMPREHEN METABOLIC PANEL: CPT

## 2018-01-01 PROCEDURE — 2000000000 HC ICU R&B

## 2018-01-01 PROCEDURE — 85027 COMPLETE CBC AUTOMATED: CPT

## 2018-01-01 PROCEDURE — 94002 VENT MGMT INPAT INIT DAY: CPT

## 2018-01-01 PROCEDURE — 94762 N-INVAS EAR/PLS OXIMTRY CONT: CPT

## 2018-01-01 PROCEDURE — 84484 ASSAY OF TROPONIN QUANT: CPT

## 2018-01-01 PROCEDURE — 2500000003 HC RX 250 WO HCPCS

## 2018-01-01 PROCEDURE — 85014 HEMATOCRIT: CPT

## 2018-01-01 PROCEDURE — 85025 COMPLETE CBC W/AUTO DIFF WBC: CPT

## 2018-01-01 PROCEDURE — 94003 VENT MGMT INPAT SUBQ DAY: CPT

## 2018-01-01 PROCEDURE — 2709999900 HC NON-CHARGEABLE SUPPLY: Performed by: SURGERY

## 2018-01-01 PROCEDURE — 27602 DECOMPRESSION OF LOWER LEG: CPT

## 2018-01-01 PROCEDURE — 86927 PLASMA FRESH FROZEN: CPT

## 2018-01-01 PROCEDURE — C1887 CATHETER, GUIDING: HCPCS | Performed by: SURGERY

## 2018-01-01 PROCEDURE — 84132 ASSAY OF SERUM POTASSIUM: CPT

## 2018-01-01 PROCEDURE — 80048 BASIC METABOLIC PNL TOTAL CA: CPT

## 2018-01-01 PROCEDURE — 2780000010 HC IMPLANT OTHER

## 2018-01-01 PROCEDURE — 99232 SBSQ HOSP IP/OBS MODERATE 35: CPT | Performed by: FAMILY MEDICINE

## 2018-01-01 PROCEDURE — 04CL0ZZ EXTIRPATION OF MATTER FROM LEFT FEMORAL ARTERY, OPEN APPROACH: ICD-10-PCS | Performed by: SURGERY

## 2018-01-01 PROCEDURE — 6360000002 HC RX W HCPCS

## 2018-01-01 PROCEDURE — 84100 ASSAY OF PHOSPHORUS: CPT

## 2018-01-01 PROCEDURE — 80076 HEPATIC FUNCTION PANEL: CPT

## 2018-01-01 PROCEDURE — 86022 PLATELET ANTIBODIES: CPT

## 2018-01-01 PROCEDURE — 88304 TISSUE EXAM BY PATHOLOGIST: CPT

## 2018-01-01 PROCEDURE — 3600000015 HC SURGERY LEVEL 5 ADDTL 15MIN: Performed by: SURGERY

## 2018-01-01 PROCEDURE — 6360000002 HC RX W HCPCS: Performed by: SURGERY

## 2018-01-01 PROCEDURE — 3600000005 HC SURGERY LEVEL 5 BASE: Performed by: SURGERY

## 2018-01-01 PROCEDURE — 83010 ASSAY OF HAPTOGLOBIN QUANT: CPT

## 2018-01-01 PROCEDURE — 2500000003 HC RX 250 WO HCPCS: Performed by: ANESTHESIOLOGY

## 2018-01-01 PROCEDURE — 2500000003 HC RX 250 WO HCPCS: Performed by: INTERNAL MEDICINE

## 2018-01-01 PROCEDURE — S0028 INJECTION, FAMOTIDINE, 20 MG: HCPCS | Performed by: STUDENT IN AN ORGANIZED HEALTH CARE EDUCATION/TRAINING PROGRAM

## 2018-01-01 PROCEDURE — 82550 ASSAY OF CK (CPK): CPT

## 2018-01-01 PROCEDURE — 2500000003 HC RX 250 WO HCPCS: Performed by: NURSE ANESTHETIST, CERTIFIED REGISTERED

## 2018-01-01 PROCEDURE — 3700000000 HC ANESTHESIA ATTENDED CARE: Performed by: SURGERY

## 2018-01-01 PROCEDURE — C1757 CATH, THROMBECTOMY/EMBOLECT: HCPCS

## 2018-01-01 PROCEDURE — B41GZZZ FLUOROSCOPY OF LEFT LOWER EXTREMITY ARTERIES: ICD-10-PCS | Performed by: SURGERY

## 2018-01-01 PROCEDURE — 85384 FIBRINOGEN ACTIVITY: CPT

## 2018-01-01 PROCEDURE — 35661 BPG FEMORAL-FEMORAL: CPT | Performed by: SURGERY

## 2018-01-01 PROCEDURE — 94770 HC ETCO2 MONITOR DAILY: CPT

## 2018-01-01 PROCEDURE — P9012 CRYOPRECIPITATE EACH UNIT: HCPCS

## 2018-01-01 PROCEDURE — 82565 ASSAY OF CREATININE: CPT

## 2018-01-01 PROCEDURE — 0DJD8ZZ INSPECTION OF LOWER INTESTINAL TRACT, VIA NATURAL OR ARTIFICIAL OPENING ENDOSCOPIC: ICD-10-PCS | Performed by: SURGERY

## 2018-01-01 PROCEDURE — 82435 ASSAY OF BLOOD CHLORIDE: CPT

## 2018-01-01 PROCEDURE — 93005 ELECTROCARDIOGRAM TRACING: CPT

## 2018-01-01 PROCEDURE — 86901 BLOOD TYPING SEROLOGIC RH(D): CPT

## 2018-01-01 PROCEDURE — 82533 TOTAL CORTISOL: CPT

## 2018-01-01 PROCEDURE — 03160JB BYPASS LEFT AXILLARY ARTERY TO LEFT LOWER LEG ARTERY WITH SYNTHETIC SUBSTITUTE, OPEN APPROACH: ICD-10-PCS | Performed by: SURGERY

## 2018-01-01 PROCEDURE — 6360000004 HC RX CONTRAST MEDICATION

## 2018-01-01 PROCEDURE — 99024 POSTOP FOLLOW-UP VISIT: CPT | Performed by: SURGERY

## 2018-01-01 PROCEDURE — 82805 BLOOD GASES W/O2 SATURATION: CPT

## 2018-01-01 PROCEDURE — 84295 ASSAY OF SERUM SODIUM: CPT

## 2018-01-01 PROCEDURE — 76775 US EXAM ABDO BACK WALL LIM: CPT

## 2018-01-01 PROCEDURE — B41FZZZ FLUOROSCOPY OF RIGHT LOWER EXTREMITY ARTERIES: ICD-10-PCS | Performed by: SURGERY

## 2018-01-01 PROCEDURE — 35654 BPG AXILLARY-FEMORAL-FEMORAL: CPT

## 2018-01-01 PROCEDURE — C1768 GRAFT, VASCULAR: HCPCS | Performed by: SURGERY

## 2018-01-01 PROCEDURE — 27602 DECOMPRESSION OF LOWER LEG: CPT | Performed by: SURGERY

## 2018-01-01 PROCEDURE — 5A1D90Z PERFORMANCE OF URINARY FILTRATION, CONTINUOUS, GREATER THAN 18 HOURS PER DAY: ICD-10-PCS | Performed by: INTERNAL MEDICINE

## 2018-01-01 PROCEDURE — 82330 ASSAY OF CALCIUM: CPT

## 2018-01-01 PROCEDURE — C1757 CATH, THROMBECTOMY/EMBOLECT: HCPCS | Performed by: SURGERY

## 2018-01-01 PROCEDURE — 0KNS0ZZ RELEASE RIGHT LOWER LEG MUSCLE, OPEN APPROACH: ICD-10-PCS | Performed by: SURGERY

## 2018-01-01 PROCEDURE — 3700000001 HC ADD 15 MINUTES (ANESTHESIA): Performed by: SURGERY

## 2018-01-01 PROCEDURE — 36600 WITHDRAWAL OF ARTERIAL BLOOD: CPT

## 2018-01-01 PROCEDURE — 6370000000 HC RX 637 (ALT 250 FOR IP)

## 2018-01-01 PROCEDURE — 2580000003 HC RX 258

## 2018-01-01 PROCEDURE — 86900 BLOOD TYPING SEROLOGIC ABO: CPT

## 2018-01-01 PROCEDURE — 3700000000 HC ANESTHESIA ATTENDED CARE

## 2018-01-01 PROCEDURE — 93306 TTE W/DOPPLER COMPLETE: CPT

## 2018-01-01 PROCEDURE — 86850 RBC ANTIBODY SCREEN: CPT

## 2018-01-01 PROCEDURE — 6360000002 HC RX W HCPCS: Performed by: INTERNAL MEDICINE

## 2018-01-01 PROCEDURE — 041L0JH BYPASS LEFT FEMORAL ARTERY TO RIGHT FEMORAL ARTERY WITH SYNTHETIC SUBSTITUTE, OPEN APPROACH: ICD-10-PCS | Performed by: SURGERY

## 2018-01-01 PROCEDURE — 99253 IP/OBS CNSLTJ NEW/EST LOW 45: CPT | Performed by: FAMILY MEDICINE

## 2018-01-01 PROCEDURE — B410ZZZ FLUOROSCOPY OF ABDOMINAL AORTA: ICD-10-PCS | Performed by: SURGERY

## 2018-01-01 PROCEDURE — C1769 GUIDE WIRE: HCPCS | Performed by: SURGERY

## 2018-01-01 PROCEDURE — 6360000004 HC RX CONTRAST MEDICATION: Performed by: STUDENT IN AN ORGANIZED HEALTH CARE EDUCATION/TRAINING PROGRAM

## 2018-01-01 PROCEDURE — 6370000000 HC RX 637 (ALT 250 FOR IP): Performed by: INTERNAL MEDICINE

## 2018-01-01 PROCEDURE — 86920 COMPATIBILITY TEST SPIN: CPT

## 2018-01-01 PROCEDURE — 3700000001 HC ADD 15 MINUTES (ANESTHESIA)

## 2018-01-01 PROCEDURE — 85379 FIBRIN DEGRADATION QUANT: CPT

## 2018-01-01 PROCEDURE — 81001 URINALYSIS AUTO W/SCOPE: CPT

## 2018-01-01 PROCEDURE — 35654 BPG AXILLARY-FEMORAL-FEMORAL: CPT | Performed by: SURGERY

## 2018-01-01 PROCEDURE — C1769 GUIDE WIRE: HCPCS

## 2018-01-01 PROCEDURE — 6360000002 HC RX W HCPCS: Performed by: ANESTHESIOLOGY

## 2018-01-01 PROCEDURE — P9016 RBC LEUKOCYTES REDUCED: HCPCS

## 2018-01-01 PROCEDURE — 94640 AIRWAY INHALATION TREATMENT: CPT

## 2018-01-01 DEVICE — GRAFT VASC L50CM DIA8MM RNG L40CM EPTFE THN WALLED CBAS HEP: Type: IMPLANTABLE DEVICE | Site: LEG | Status: FUNCTIONAL

## 2018-01-01 RX ORDER — MORPHINE SULFATE 2 MG/ML
2 INJECTION, SOLUTION INTRAMUSCULAR; INTRAVENOUS
Status: DISCONTINUED | OUTPATIENT
Start: 2018-01-01 | End: 2018-01-01

## 2018-01-01 RX ORDER — NICOTINE POLACRILEX 4 MG
15 LOZENGE BUCCAL PRN
Status: DISCONTINUED | OUTPATIENT
Start: 2018-01-01 | End: 2018-01-01 | Stop reason: HOSPADM

## 2018-01-01 RX ORDER — ADENOSINE 3 MG/ML
INJECTION, SOLUTION INTRAVENOUS
Status: COMPLETED
Start: 2018-01-01 | End: 2018-01-01

## 2018-01-01 RX ORDER — DOPAMINE HYDROCHLORIDE 160 MG/100ML
2.5 INJECTION, SOLUTION INTRAVENOUS CONTINUOUS
Status: DISCONTINUED | OUTPATIENT
Start: 2018-01-01 | End: 2018-01-01

## 2018-01-01 RX ORDER — PROPOFOL 10 MG/ML
10 INJECTION, EMULSION INTRAVENOUS
Status: DISCONTINUED | OUTPATIENT
Start: 2018-01-01 | End: 2018-01-01

## 2018-01-01 RX ORDER — SODIUM CHLORIDE 9 MG/ML
INJECTION, SOLUTION INTRAVENOUS CONTINUOUS PRN
Status: DISCONTINUED | OUTPATIENT
Start: 2018-01-01 | End: 2018-01-01 | Stop reason: SDUPTHER

## 2018-01-01 RX ORDER — CLOPIDOGREL BISULFATE 75 MG/1
75 TABLET ORAL DAILY
Status: CANCELLED | OUTPATIENT
Start: 2018-01-01

## 2018-01-01 RX ORDER — MORPHINE SULFATE 4 MG/ML
4 INJECTION, SOLUTION INTRAMUSCULAR; INTRAVENOUS
Status: CANCELLED | OUTPATIENT
Start: 2018-01-01

## 2018-01-01 RX ORDER — 0.9 % SODIUM CHLORIDE 0.9 %
250 INTRAVENOUS SOLUTION INTRAVENOUS ONCE
Status: DISCONTINUED | OUTPATIENT
Start: 2018-01-01 | End: 2018-01-01

## 2018-01-01 RX ORDER — HEPARIN SODIUM 10000 [USP'U]/100ML
INJECTION, SOLUTION INTRAVENOUS CONTINUOUS PRN
Status: DISCONTINUED | OUTPATIENT
Start: 2018-01-01 | End: 2018-01-01 | Stop reason: SDUPTHER

## 2018-01-01 RX ORDER — HEPARIN SODIUM 10000 [USP'U]/100ML
INJECTION, SOLUTION INTRAVENOUS
Status: COMPLETED
Start: 2018-01-01 | End: 2018-01-01

## 2018-01-01 RX ORDER — DEXTROSE MONOHYDRATE 50 MG/ML
100 INJECTION, SOLUTION INTRAVENOUS PRN
Status: DISCONTINUED | OUTPATIENT
Start: 2018-01-01 | End: 2018-01-01 | Stop reason: HOSPADM

## 2018-01-01 RX ORDER — MAGNESIUM SULFATE 1 G/100ML
1 INJECTION INTRAVENOUS PRN
Status: DISCONTINUED | OUTPATIENT
Start: 2018-01-01 | End: 2018-01-01

## 2018-01-01 RX ORDER — ROCURONIUM BROMIDE 10 MG/ML
INJECTION, SOLUTION INTRAVENOUS
Status: COMPLETED
Start: 2018-01-01 | End: 2018-01-01

## 2018-01-01 RX ORDER — FENTANYL CITRATE 50 UG/ML
25 INJECTION, SOLUTION INTRAMUSCULAR; INTRAVENOUS ONCE
Status: DISCONTINUED | OUTPATIENT
Start: 2018-01-01 | End: 2018-01-01

## 2018-01-01 RX ORDER — DEXTROSE MONOHYDRATE 25 G/50ML
12.5 INJECTION, SOLUTION INTRAVENOUS PRN
Status: DISCONTINUED | OUTPATIENT
Start: 2018-01-01 | End: 2018-01-01 | Stop reason: HOSPADM

## 2018-01-01 RX ORDER — CELECOXIB 200 MG/1
1 CAPSULE ORAL 2 TIMES DAILY
COMMUNITY
Start: 2018-01-01

## 2018-01-01 RX ORDER — SODIUM CHLORIDE, SODIUM LACTATE, POTASSIUM CHLORIDE, CALCIUM CHLORIDE 600; 310; 30; 20 MG/100ML; MG/100ML; MG/100ML; MG/100ML
INJECTION, SOLUTION INTRAVENOUS CONTINUOUS PRN
Status: DISCONTINUED | OUTPATIENT
Start: 2018-01-01 | End: 2018-01-01 | Stop reason: SDUPTHER

## 2018-01-01 RX ORDER — LABETALOL HYDROCHLORIDE 5 MG/ML
1 INJECTION, SOLUTION INTRAVENOUS CONTINUOUS
Status: DISCONTINUED | OUTPATIENT
Start: 2018-01-01 | End: 2018-01-01

## 2018-01-01 RX ORDER — ROCURONIUM BROMIDE 10 MG/ML
INJECTION, SOLUTION INTRAVENOUS PRN
Status: DISCONTINUED | OUTPATIENT
Start: 2018-01-01 | End: 2018-01-01 | Stop reason: SDUPTHER

## 2018-01-01 RX ORDER — OXYCODONE HYDROCHLORIDE AND ACETAMINOPHEN 5; 325 MG/1; MG/1
2 TABLET ORAL EVERY 4 HOURS PRN
Status: DISCONTINUED | OUTPATIENT
Start: 2018-01-01 | End: 2018-01-01 | Stop reason: HOSPADM

## 2018-01-01 RX ORDER — MORPHINE SULFATE 4 MG/ML
4 INJECTION, SOLUTION INTRAMUSCULAR; INTRAVENOUS ONCE
Status: COMPLETED | OUTPATIENT
Start: 2018-01-01 | End: 2018-01-01

## 2018-01-01 RX ORDER — ROCURONIUM BROMIDE 10 MG/ML
0.6 INJECTION, SOLUTION INTRAVENOUS ONCE
Status: DISCONTINUED | OUTPATIENT
Start: 2018-01-01 | End: 2018-01-01

## 2018-01-01 RX ORDER — SODIUM BICARBONATE 42 MG/ML
INJECTION, SOLUTION INTRAVENOUS PRN
Status: DISCONTINUED | OUTPATIENT
Start: 2018-01-01 | End: 2018-01-01 | Stop reason: SDUPTHER

## 2018-01-01 RX ORDER — SODIUM CHLORIDE 0.9 % (FLUSH) 0.9 %
10 SYRINGE (ML) INJECTION EVERY 12 HOURS SCHEDULED
Status: DISCONTINUED | OUTPATIENT
Start: 2018-01-01 | End: 2018-01-01 | Stop reason: HOSPADM

## 2018-01-01 RX ORDER — HEPARIN SODIUM 1000 [USP'U]/ML
80 INJECTION, SOLUTION INTRAVENOUS; SUBCUTANEOUS ONCE
Status: DISCONTINUED | OUTPATIENT
Start: 2018-01-01 | End: 2018-01-01

## 2018-01-01 RX ORDER — CILOSTAZOL 100 MG/1
100 TABLET ORAL 2 TIMES DAILY
Status: CANCELLED | OUTPATIENT
Start: 2018-01-01

## 2018-01-01 RX ORDER — DOPAMINE HYDROCHLORIDE 160 MG/100ML
INJECTION, SOLUTION INTRAVENOUS
Status: DISCONTINUED
Start: 2018-01-01 | End: 2018-01-01

## 2018-01-01 RX ORDER — PREDNISONE 20 MG/1
TABLET ORAL
Qty: 13 TABLET | Refills: 0 | Status: SHIPPED | OUTPATIENT
Start: 2018-01-01

## 2018-01-01 RX ORDER — CEFAZOLIN SODIUM 1 G/50ML
1 INJECTION, SOLUTION INTRAVENOUS EVERY 8 HOURS
Status: DISCONTINUED | OUTPATIENT
Start: 2018-01-01 | End: 2018-01-01

## 2018-01-01 RX ORDER — LORAZEPAM 2 MG/ML
1 INJECTION INTRAMUSCULAR
Status: DISCONTINUED | OUTPATIENT
Start: 2018-01-01 | End: 2018-01-01 | Stop reason: HOSPADM

## 2018-01-01 RX ORDER — HEPARIN SODIUM 10000 [USP'U]/100ML
2100 INJECTION, SOLUTION INTRAVENOUS CONTINUOUS
Status: DISCONTINUED | OUTPATIENT
Start: 2018-01-01 | End: 2018-01-01

## 2018-01-01 RX ORDER — HEPARIN SODIUM 1000 [USP'U]/ML
40 INJECTION, SOLUTION INTRAVENOUS; SUBCUTANEOUS PRN
Status: CANCELLED | OUTPATIENT
Start: 2018-01-01

## 2018-01-01 RX ORDER — ROCURONIUM BROMIDE 10 MG/ML
INJECTION, SOLUTION INTRAVENOUS
Status: DISPENSED
Start: 2018-01-01 | End: 2018-01-01

## 2018-01-01 RX ORDER — ATORVASTATIN CALCIUM 80 MG/1
40 TABLET, FILM COATED ORAL DAILY
Status: CANCELLED | OUTPATIENT
Start: 2018-01-01

## 2018-01-01 RX ORDER — PROPOFOL 10 MG/ML
INJECTION, EMULSION INTRAVENOUS PRN
Status: DISCONTINUED | OUTPATIENT
Start: 2018-01-01 | End: 2018-01-01 | Stop reason: SDUPTHER

## 2018-01-01 RX ORDER — SODIUM CHLORIDE 9 MG/ML
INJECTION, SOLUTION INTRAVENOUS CONTINUOUS
Status: DISCONTINUED | OUTPATIENT
Start: 2018-01-01 | End: 2018-01-01

## 2018-01-01 RX ORDER — SODIUM CHLORIDE 9 MG/ML
INJECTION, SOLUTION INTRAVENOUS CONTINUOUS
Status: CANCELLED | OUTPATIENT
Start: 2018-01-01

## 2018-01-01 RX ORDER — FENTANYL CITRATE 50 UG/ML
100 INJECTION, SOLUTION INTRAMUSCULAR; INTRAVENOUS ONCE
Status: COMPLETED | OUTPATIENT
Start: 2018-01-01 | End: 2018-01-01

## 2018-01-01 RX ORDER — MIDAZOLAM HYDROCHLORIDE 1 MG/ML
INJECTION INTRAMUSCULAR; INTRAVENOUS PRN
Status: DISCONTINUED | OUTPATIENT
Start: 2018-01-01 | End: 2018-01-01 | Stop reason: SDUPTHER

## 2018-01-01 RX ORDER — MORPHINE SULFATE 4 MG/ML
2 INJECTION, SOLUTION INTRAMUSCULAR; INTRAVENOUS
Status: CANCELLED | OUTPATIENT
Start: 2018-01-01

## 2018-01-01 RX ORDER — OXYCODONE HYDROCHLORIDE AND ACETAMINOPHEN 5; 325 MG/1; MG/1
1 TABLET ORAL EVERY 4 HOURS PRN
Status: DISCONTINUED | OUTPATIENT
Start: 2018-01-01 | End: 2018-01-01 | Stop reason: HOSPADM

## 2018-01-01 RX ORDER — DEXTROSE MONOHYDRATE 25 G/50ML
25 INJECTION, SOLUTION INTRAVENOUS PRN
Status: DISCONTINUED | OUTPATIENT
Start: 2018-01-01 | End: 2018-01-01 | Stop reason: HOSPADM

## 2018-01-01 RX ORDER — HEPARIN SODIUM 1000 [USP'U]/ML
40 INJECTION, SOLUTION INTRAVENOUS; SUBCUTANEOUS PRN
Status: DISCONTINUED | OUTPATIENT
Start: 2018-01-01 | End: 2018-01-01

## 2018-01-01 RX ORDER — MORPHINE SULFATE 4 MG/ML
6 INJECTION, SOLUTION INTRAMUSCULAR; INTRAVENOUS ONCE
Status: COMPLETED | OUTPATIENT
Start: 2018-01-01 | End: 2018-01-01

## 2018-01-01 RX ORDER — HEPARIN SODIUM 1000 [USP'U]/ML
80 INJECTION, SOLUTION INTRAVENOUS; SUBCUTANEOUS PRN
Status: DISCONTINUED | OUTPATIENT
Start: 2018-01-01 | End: 2018-01-01

## 2018-01-01 RX ORDER — DOPAMINE HYDROCHLORIDE 160 MG/100ML
INJECTION, SOLUTION INTRAVENOUS
Status: COMPLETED
Start: 2018-01-01 | End: 2018-01-01

## 2018-01-01 RX ORDER — ALBUTEROL SULFATE 90 UG/1
6 AEROSOL, METERED RESPIRATORY (INHALATION) EVERY 6 HOURS PRN
Status: DISCONTINUED | OUTPATIENT
Start: 2018-01-01 | End: 2018-01-01 | Stop reason: HOSPADM

## 2018-01-01 RX ORDER — IODIXANOL 320 MG/ML
INJECTION, SOLUTION INTRAVASCULAR PRN
Status: DISCONTINUED | OUTPATIENT
Start: 2018-01-01 | End: 2018-01-01 | Stop reason: HOSPADM

## 2018-01-01 RX ORDER — DIGOXIN 0.25 MG/ML
250 INJECTION INTRAMUSCULAR; INTRAVENOUS ONCE
Status: COMPLETED | OUTPATIENT
Start: 2018-01-01 | End: 2018-01-01

## 2018-01-01 RX ORDER — POTASSIUM CHLORIDE 29.8 MG/ML
20 INJECTION INTRAVENOUS PRN
Status: DISCONTINUED | OUTPATIENT
Start: 2018-01-01 | End: 2018-01-01

## 2018-01-01 RX ORDER — 0.9 % SODIUM CHLORIDE 0.9 %
250 INTRAVENOUS SOLUTION INTRAVENOUS ONCE
Status: COMPLETED | OUTPATIENT
Start: 2018-01-01 | End: 2018-01-01

## 2018-01-01 RX ORDER — 0.9 % SODIUM CHLORIDE 0.9 %
1000 INTRAVENOUS SOLUTION INTRAVENOUS ONCE
Status: COMPLETED | OUTPATIENT
Start: 2018-01-01 | End: 2018-01-01

## 2018-01-01 RX ORDER — ATORVASTATIN CALCIUM 40 MG/1
40 TABLET, FILM COATED ORAL DAILY
COMMUNITY

## 2018-01-01 RX ORDER — MIDAZOLAM HYDROCHLORIDE 1 MG/ML
1 INJECTION INTRAMUSCULAR; INTRAVENOUS
Status: DISCONTINUED | OUTPATIENT
Start: 2018-01-01 | End: 2018-01-01

## 2018-01-01 RX ORDER — MORPHINE SULFATE 2 MG/ML
2 INJECTION, SOLUTION INTRAMUSCULAR; INTRAVENOUS
Status: DISCONTINUED | OUTPATIENT
Start: 2018-01-01 | End: 2018-01-01 | Stop reason: HOSPADM

## 2018-01-01 RX ORDER — SODIUM CHLORIDE 0.9 % (FLUSH) 0.9 %
10 SYRINGE (ML) INJECTION PRN
Status: DISCONTINUED | OUTPATIENT
Start: 2018-01-01 | End: 2018-01-01 | Stop reason: HOSPADM

## 2018-01-01 RX ORDER — FENTANYL CITRATE 50 UG/ML
50 INJECTION, SOLUTION INTRAMUSCULAR; INTRAVENOUS
Status: DISCONTINUED | OUTPATIENT
Start: 2018-01-01 | End: 2018-01-01

## 2018-01-01 RX ORDER — HEPARIN SODIUM 1000 [USP'U]/ML
INJECTION, SOLUTION INTRAVENOUS; SUBCUTANEOUS PRN
Status: DISCONTINUED | OUTPATIENT
Start: 2018-01-01 | End: 2018-01-01 | Stop reason: SDUPTHER

## 2018-01-01 RX ORDER — PANTOPRAZOLE SODIUM 40 MG/10ML
40 INJECTION, POWDER, LYOPHILIZED, FOR SOLUTION INTRAVENOUS 2 TIMES DAILY
Status: DISCONTINUED | OUTPATIENT
Start: 2018-01-01 | End: 2018-01-01 | Stop reason: HOSPADM

## 2018-01-01 RX ORDER — GLUCAGON 1 MG/ML
1 KIT INJECTION PRN
Status: DISCONTINUED | OUTPATIENT
Start: 2018-01-01 | End: 2018-01-01 | Stop reason: HOSPADM

## 2018-01-01 RX ORDER — ONDANSETRON 2 MG/ML
4 INJECTION INTRAMUSCULAR; INTRAVENOUS EVERY 6 HOURS PRN
Status: DISCONTINUED | OUTPATIENT
Start: 2018-01-01 | End: 2018-01-01 | Stop reason: HOSPADM

## 2018-01-01 RX ORDER — FENTANYL CITRATE 50 UG/ML
100 INJECTION, SOLUTION INTRAMUSCULAR; INTRAVENOUS
Status: DISCONTINUED | OUTPATIENT
Start: 2018-01-01 | End: 2018-01-01

## 2018-01-01 RX ORDER — CYCLOBENZAPRINE HCL 10 MG
10 TABLET ORAL 3 TIMES DAILY PRN
Qty: 10 TABLET | Refills: 0 | Status: SHIPPED | OUTPATIENT
Start: 2018-01-01 | End: 2018-09-17

## 2018-01-01 RX ORDER — MORPHINE SULFATE 4 MG/ML
4 INJECTION, SOLUTION INTRAMUSCULAR; INTRAVENOUS
Status: DISCONTINUED | OUTPATIENT
Start: 2018-01-01 | End: 2018-01-01 | Stop reason: HOSPADM

## 2018-01-01 RX ORDER — HEPARIN SODIUM 1000 [USP'U]/ML
80 INJECTION, SOLUTION INTRAVENOUS; SUBCUTANEOUS PRN
Status: CANCELLED | OUTPATIENT
Start: 2018-01-01

## 2018-01-01 RX ORDER — MIDAZOLAM HYDROCHLORIDE 1 MG/ML
2 INJECTION INTRAMUSCULAR; INTRAVENOUS ONCE
Status: COMPLETED | OUTPATIENT
Start: 2018-01-01 | End: 2018-01-01

## 2018-01-01 RX ORDER — CEFAZOLIN SODIUM 1 G/3ML
INJECTION, POWDER, FOR SOLUTION INTRAMUSCULAR; INTRAVENOUS PRN
Status: DISCONTINUED | OUTPATIENT
Start: 2018-01-01 | End: 2018-01-01 | Stop reason: SDUPTHER

## 2018-01-01 RX ORDER — MAGNESIUM HYDROXIDE 1200 MG/15ML
LIQUID ORAL CONTINUOUS PRN
Status: COMPLETED | OUTPATIENT
Start: 2018-01-01 | End: 2018-01-01

## 2018-01-01 RX ORDER — LIDOCAINE HYDROCHLORIDE 10 MG/ML
INJECTION, SOLUTION INFILTRATION; PERINEURAL PRN
Status: DISCONTINUED | OUTPATIENT
Start: 2018-01-01 | End: 2018-01-01 | Stop reason: SDUPTHER

## 2018-01-01 RX ORDER — OXYCODONE HYDROCHLORIDE AND ACETAMINOPHEN 5; 325 MG/1; MG/1
1 TABLET ORAL ONCE
Status: COMPLETED | OUTPATIENT
Start: 2018-01-01 | End: 2018-01-01

## 2018-01-01 RX ORDER — DIGOXIN 0.25 MG/ML
INJECTION INTRAMUSCULAR; INTRAVENOUS
Status: COMPLETED
Start: 2018-01-01 | End: 2018-01-01

## 2018-01-01 RX ORDER — FENTANYL CITRATE 50 UG/ML
25 INJECTION, SOLUTION INTRAMUSCULAR; INTRAVENOUS ONCE
Status: COMPLETED | OUTPATIENT
Start: 2018-01-01 | End: 2018-01-01

## 2018-01-01 RX ORDER — LABETALOL HYDROCHLORIDE 5 MG/ML
10 INJECTION, SOLUTION INTRAVENOUS ONCE
Status: COMPLETED | OUTPATIENT
Start: 2018-01-01 | End: 2018-01-01

## 2018-01-01 RX ORDER — LORAZEPAM 2 MG/ML
1 INJECTION INTRAMUSCULAR EVERY 4 HOURS PRN
Status: DISCONTINUED | OUTPATIENT
Start: 2018-01-01 | End: 2018-01-01 | Stop reason: HOSPADM

## 2018-01-01 RX ORDER — HEPARIN SODIUM 1000 [USP'U]/ML
INJECTION, SOLUTION INTRAVENOUS; SUBCUTANEOUS
Status: DISCONTINUED
Start: 2018-01-01 | End: 2018-01-01

## 2018-01-01 RX ORDER — CLOPIDOGREL BISULFATE 75 MG/1
1 TABLET ORAL DAILY
COMMUNITY
Start: 2018-01-01

## 2018-01-01 RX ORDER — ALBUTEROL SULFATE 90 UG/1
6 AEROSOL, METERED RESPIRATORY (INHALATION) EVERY 4 HOURS
Status: DISCONTINUED | OUTPATIENT
Start: 2018-01-01 | End: 2018-01-01

## 2018-01-01 RX ORDER — DIGOXIN 0.25 MG/ML
250 INJECTION INTRAMUSCULAR; INTRAVENOUS ONCE
Status: DISCONTINUED | OUTPATIENT
Start: 2018-01-01 | End: 2018-01-01 | Stop reason: SDUPTHER

## 2018-01-01 RX ORDER — DOPAMINE HYDROCHLORIDE 160 MG/100ML
2.5 INJECTION, SOLUTION INTRAVENOUS CONTINUOUS
Status: DISCONTINUED | OUTPATIENT
Start: 2018-01-01 | End: 2018-01-01 | Stop reason: HOSPADM

## 2018-01-01 RX ORDER — CILOSTAZOL 100 MG/1
1 TABLET ORAL 2 TIMES DAILY
COMMUNITY
Start: 2018-01-01

## 2018-01-01 RX ORDER — 0.9 % SODIUM CHLORIDE 0.9 %
10 VIAL (ML) INJECTION DAILY
Status: DISCONTINUED | OUTPATIENT
Start: 2018-01-01 | End: 2018-01-01 | Stop reason: HOSPADM

## 2018-01-01 RX ORDER — FENTANYL CITRATE 50 UG/ML
INJECTION, SOLUTION INTRAMUSCULAR; INTRAVENOUS PRN
Status: DISCONTINUED | OUTPATIENT
Start: 2018-01-01 | End: 2018-01-01 | Stop reason: SDUPTHER

## 2018-01-01 RX ORDER — HEPARIN SODIUM 10000 [USP'U]/100ML
18 INJECTION, SOLUTION INTRAVENOUS CONTINUOUS
Status: CANCELLED | OUTPATIENT
Start: 2018-01-01

## 2018-01-01 RX ORDER — CALCIUM CHLORIDE 100 MG/ML
INJECTION INTRAVENOUS; INTRAVENTRICULAR PRN
Status: DISCONTINUED | OUTPATIENT
Start: 2018-01-01 | End: 2018-01-01 | Stop reason: SDUPTHER

## 2018-01-01 RX ORDER — HEPARIN SODIUM 1000 [USP'U]/ML
INJECTION, SOLUTION INTRAVENOUS; SUBCUTANEOUS PRN
Status: DISCONTINUED | OUTPATIENT
Start: 2018-01-01 | End: 2018-01-01 | Stop reason: HOSPADM

## 2018-01-01 RX ORDER — ROCURONIUM BROMIDE 10 MG/ML
50 INJECTION, SOLUTION INTRAVENOUS ONCE
Status: COMPLETED | OUTPATIENT
Start: 2018-01-01 | End: 2018-01-01

## 2018-01-01 RX ADMIN — SODIUM CHLORIDE 1000 ML: 9 INJECTION, SOLUTION INTRAVENOUS at 19:10

## 2018-01-01 RX ADMIN — ROCURONIUM BROMIDE 30 MG: 10 INJECTION INTRAVENOUS at 05:15

## 2018-01-01 RX ADMIN — SODIUM CHLORIDE: 900 INJECTION, SOLUTION INTRAVENOUS at 16:06

## 2018-01-01 RX ADMIN — ROCURONIUM BROMIDE 50 MG: 10 INJECTION INTRAVENOUS at 16:01

## 2018-01-01 RX ADMIN — ALBUTEROL SULFATE 6 PUFF: 90 AEROSOL, METERED RESPIRATORY (INHALATION) at 03:27

## 2018-01-01 RX ADMIN — PIPERACILLIN SODIUM,TAZOBACTAM SODIUM 2.25 G: 2; .25 INJECTION, POWDER, FOR SOLUTION INTRAVENOUS at 10:09

## 2018-01-01 RX ADMIN — SODIUM CHLORIDE 2.4 UNITS/HR: 9 INJECTION, SOLUTION INTRAVENOUS at 05:02

## 2018-01-01 RX ADMIN — ROCURONIUM BROMIDE 50 MG: 10 INJECTION, SOLUTION INTRAVENOUS at 03:05

## 2018-01-01 RX ADMIN — OXYCODONE HYDROCHLORIDE AND ACETAMINOPHEN 1 TABLET: 5; 325 TABLET ORAL at 22:50

## 2018-01-01 RX ADMIN — ROCURONIUM BROMIDE 50 MG: 10 INJECTION INTRAVENOUS at 16:46

## 2018-01-01 RX ADMIN — INSULIN LISPRO 1 UNITS: 100 INJECTION, SOLUTION INTRAVENOUS; SUBCUTANEOUS at 23:02

## 2018-01-01 RX ADMIN — SODIUM CHLORIDE 250 ML: 9 INJECTION, SOLUTION INTRAVENOUS at 19:30

## 2018-01-01 RX ADMIN — MORPHINE SULFATE 6 MG: 4 INJECTION INTRAVENOUS at 22:57

## 2018-01-01 RX ADMIN — SODIUM BICARBONATE 50 MEQ: 84 INJECTION, SOLUTION INTRAVENOUS at 10:41

## 2018-01-01 RX ADMIN — DIGOXIN 250 MCG: 0.25 INJECTION INTRAMUSCULAR; INTRAVENOUS at 14:55

## 2018-01-01 RX ADMIN — INSULIN LISPRO 3 UNITS: 100 INJECTION, SOLUTION INTRAVENOUS; SUBCUTANEOUS at 12:27

## 2018-01-01 RX ADMIN — PHENYLEPHRINE HYDROCHLORIDE 100 MCG: 10 INJECTION INTRAVENOUS at 03:43

## 2018-01-01 RX ADMIN — SODIUM BICARBONATE 50 MEQ: 84 INJECTION, SOLUTION INTRAVENOUS at 11:55

## 2018-01-01 RX ADMIN — SODIUM BICARBONATE 50 MEQ: 84 INJECTION, SOLUTION INTRAVENOUS at 15:07

## 2018-01-01 RX ADMIN — SODIUM BICARBONATE 50 MEQ: 84 INJECTION, SOLUTION INTRAVENOUS at 14:08

## 2018-01-01 RX ADMIN — Medication 3 MG/HR: at 21:54

## 2018-01-01 RX ADMIN — Medication 10 ML: at 21:00

## 2018-01-01 RX ADMIN — FENTANYL CITRATE 50 MCG: 50 INJECTION INTRAMUSCULAR; INTRAVENOUS at 08:47

## 2018-01-01 RX ADMIN — MIDAZOLAM HYDROCHLORIDE 2 MG: 1 INJECTION, SOLUTION INTRAMUSCULAR; INTRAVENOUS at 07:05

## 2018-01-01 RX ADMIN — PHENYLEPHRINE HYDROCHLORIDE 100 MCG: 10 INJECTION INTRAVENOUS at 14:10

## 2018-01-01 RX ADMIN — MORPHINE SULFATE 4 MG: 4 INJECTION INTRAVENOUS at 16:00

## 2018-01-01 RX ADMIN — PHENYLEPHRINE HYDROCHLORIDE 100 MCG: 10 INJECTION INTRAVENOUS at 05:25

## 2018-01-01 RX ADMIN — FAMOTIDINE 20 MG: 10 INJECTION, SOLUTION INTRAVENOUS at 23:30

## 2018-01-01 RX ADMIN — HEPARIN SODIUM AND DEXTROSE 2100 UNITS/HR: 10000; 5 INJECTION INTRAVENOUS at 22:38

## 2018-01-01 RX ADMIN — IOPAMIDOL 125 ML: 755 INJECTION, SOLUTION INTRAVENOUS at 22:56

## 2018-01-01 RX ADMIN — PHENYLEPHRINE HYDROCHLORIDE 100 MCG: 10 INJECTION INTRAVENOUS at 15:08

## 2018-01-01 RX ADMIN — Medication 10 ML: at 23:01

## 2018-01-01 RX ADMIN — PHENYLEPHRINE HYDROCHLORIDE 15 MCG/MIN: 10 INJECTION INTRAVENOUS at 16:19

## 2018-01-01 RX ADMIN — Medication 10 MCG/MIN: at 05:45

## 2018-01-01 RX ADMIN — PHENYLEPHRINE HYDROCHLORIDE 100 MCG: 10 INJECTION INTRAVENOUS at 05:20

## 2018-01-01 RX ADMIN — VASOPRESSIN 1 UNITS/HR: 20 INJECTION INTRAVENOUS at 08:06

## 2018-01-01 RX ADMIN — ROCURONIUM BROMIDE 50 MG: 10 INJECTION INTRAVENOUS at 15:16

## 2018-01-01 RX ADMIN — SODIUM BICARBONATE: 84 INJECTION, SOLUTION INTRAVENOUS at 09:35

## 2018-01-01 RX ADMIN — SODIUM CHLORIDE: 900 INJECTION INTRAVENOUS at 00:47

## 2018-01-01 RX ADMIN — SODIUM BICARBONATE 50 MEQ: 42 INJECTION, SOLUTION INTRAVENOUS at 05:15

## 2018-01-01 RX ADMIN — INSULIN LISPRO 3 UNITS: 100 INJECTION, SOLUTION INTRAVENOUS; SUBCUTANEOUS at 09:23

## 2018-01-01 RX ADMIN — Medication 10 ML: at 12:23

## 2018-01-01 RX ADMIN — LABETALOL HYDROCHLORIDE 1 MG/MIN: 5 INJECTION, SOLUTION INTRAVENOUS at 07:52

## 2018-01-01 RX ADMIN — SODIUM BICARBONATE 50 MEQ: 84 INJECTION, SOLUTION INTRAVENOUS at 05:23

## 2018-01-01 RX ADMIN — PHENYLEPHRINE HYDROCHLORIDE 150 MCG: 10 INJECTION INTRAVENOUS at 13:50

## 2018-01-01 RX ADMIN — MORPHINE SULFATE 4 MG: 4 INJECTION INTRAVENOUS at 23:36

## 2018-01-01 RX ADMIN — HEPARIN SODIUM 10000 UNITS: 1000 INJECTION, SOLUTION INTRAVENOUS; SUBCUTANEOUS at 01:52

## 2018-01-01 RX ADMIN — INSULIN HUMAN 10 UNITS: 100 INJECTION, SOLUTION PARENTERAL at 06:10

## 2018-01-01 RX ADMIN — PHENYLEPHRINE HYDROCHLORIDE 100 MCG: 10 INJECTION INTRAVENOUS at 15:19

## 2018-01-01 RX ADMIN — MIDAZOLAM HYDROCHLORIDE 1 MG: 1 INJECTION, SOLUTION INTRAMUSCULAR; INTRAVENOUS at 20:16

## 2018-01-01 RX ADMIN — Medication 175 MCG/HR: at 08:47

## 2018-01-01 RX ADMIN — CALCIUM GLUCONATE 1 G: 98 INJECTION, SOLUTION INTRAVENOUS at 06:54

## 2018-01-01 RX ADMIN — HEPARIN SODIUM 9800 UNITS: 1000 INJECTION, SOLUTION INTRAVENOUS; SUBCUTANEOUS at 11:43

## 2018-01-01 RX ADMIN — FAMOTIDINE 20 MG: 10 INJECTION, SOLUTION INTRAVENOUS at 23:07

## 2018-01-01 RX ADMIN — SODIUM BICARBONATE 50 MEQ: 84 INJECTION, SOLUTION INTRAVENOUS at 14:42

## 2018-01-01 RX ADMIN — Medication 18 UNITS/KG/HR: at 01:53

## 2018-01-01 RX ADMIN — SODIUM CHLORIDE: 900 INJECTION, SOLUTION INTRAVENOUS at 13:02

## 2018-01-01 RX ADMIN — ROCURONIUM BROMIDE 50 MG: 10 INJECTION INTRAVENOUS at 13:07

## 2018-01-01 RX ADMIN — Medication 1500 ML/HR: at 04:58

## 2018-01-01 RX ADMIN — SODIUM BICARBONATE: 84 INJECTION, SOLUTION INTRAVENOUS at 14:26

## 2018-01-01 RX ADMIN — ROCURONIUM BROMIDE 50 MG: 10 INJECTION INTRAVENOUS at 03:05

## 2018-01-01 RX ADMIN — FENTANYL CITRATE 50 MCG: 50 INJECTION INTRAMUSCULAR; INTRAVENOUS at 14:02

## 2018-01-01 RX ADMIN — FENTANYL CITRATE 100 MCG: 50 INJECTION INTRAMUSCULAR; INTRAVENOUS at 20:08

## 2018-01-01 RX ADMIN — ROCURONIUM BROMIDE 20 MG: 10 INJECTION INTRAVENOUS at 06:53

## 2018-01-01 RX ADMIN — HEPARIN SODIUM 2100 UNITS/HR: 10000 INJECTION, SOLUTION INTRAVENOUS; SUBCUTANEOUS at 13:01

## 2018-01-01 RX ADMIN — SODIUM BICARBONATE 50 MEQ: 84 INJECTION, SOLUTION INTRAVENOUS at 08:20

## 2018-01-01 RX ADMIN — DEXTROSE MONOHYDRATE 1.74 MCG/KG/MIN: 50 INJECTION, SOLUTION INTRAVENOUS at 05:45

## 2018-01-01 RX ADMIN — SODIUM CHLORIDE 4.47 UNITS/HR: 9 INJECTION, SOLUTION INTRAVENOUS at 10:58

## 2018-01-01 RX ADMIN — DEXTROSE MONOHYDRATE 25 G: 25 INJECTION, SOLUTION INTRAVENOUS at 06:11

## 2018-01-01 RX ADMIN — LIDOCAINE HYDROCHLORIDE 50 MG: 10 INJECTION, SOLUTION INFILTRATION; PERINEURAL at 00:55

## 2018-01-01 RX ADMIN — Medication 1500 ML/HR: at 18:03

## 2018-01-01 RX ADMIN — FENTANYL CITRATE 50 MCG: 50 INJECTION INTRAMUSCULAR; INTRAVENOUS at 11:02

## 2018-01-01 RX ADMIN — VASOPRESSIN 3 UNITS/HR: 20 INJECTION INTRAVENOUS at 03:26

## 2018-01-01 RX ADMIN — ROCURONIUM BROMIDE 50 MG: 10 INJECTION INTRAVENOUS at 03:44

## 2018-01-01 RX ADMIN — ROCURONIUM BROMIDE 50 MG: 10 INJECTION INTRAVENOUS at 14:31

## 2018-01-01 RX ADMIN — PHENYLEPHRINE HYDROCHLORIDE 200 MCG: 10 INJECTION INTRAVENOUS at 15:22

## 2018-01-01 RX ADMIN — Medication 50 MEQ: at 08:28

## 2018-01-01 RX ADMIN — PANTOPRAZOLE SODIUM 40 MG: 40 INJECTION, POWDER, FOR SOLUTION INTRAVENOUS at 12:23

## 2018-01-01 RX ADMIN — HEPARIN SODIUM 5000 UNITS: 1000 INJECTION, SOLUTION INTRAVENOUS; SUBCUTANEOUS at 14:10

## 2018-01-01 RX ADMIN — PHENYLEPHRINE HYDROCHLORIDE 100 MCG: 10 INJECTION INTRAVENOUS at 16:01

## 2018-01-01 RX ADMIN — MIDAZOLAM HYDROCHLORIDE 2 MG: 1 INJECTION, SOLUTION INTRAMUSCULAR; INTRAVENOUS at 00:55

## 2018-01-01 RX ADMIN — PHENYLEPHRINE HYDROCHLORIDE 100 MCG: 10 INJECTION INTRAVENOUS at 16:04

## 2018-01-01 RX ADMIN — ROCURONIUM BROMIDE 50 MG: 10 INJECTION INTRAVENOUS at 07:01

## 2018-01-01 RX ADMIN — CEFAZOLIN SODIUM 1 G: 1 INJECTION, SOLUTION INTRAVENOUS at 23:07

## 2018-01-01 RX ADMIN — FENTANYL CITRATE 100 MCG: 50 INJECTION INTRAMUSCULAR; INTRAVENOUS at 07:05

## 2018-01-01 RX ADMIN — SODIUM BICARBONATE 50 MEQ: 84 INJECTION, SOLUTION INTRAVENOUS at 10:04

## 2018-01-01 RX ADMIN — Medication 4 MCG/MIN: at 17:37

## 2018-01-01 RX ADMIN — Medication 2 G: at 23:07

## 2018-01-01 RX ADMIN — LABETALOL HYDROCHLORIDE 10 MG: 5 INJECTION, SOLUTION INTRAVENOUS at 23:36

## 2018-01-01 RX ADMIN — FENTANYL CITRATE 100 MCG: 50 INJECTION INTRAMUSCULAR; INTRAVENOUS at 00:55

## 2018-01-01 RX ADMIN — ROCURONIUM BROMIDE 50 MG: 10 INJECTION INTRAVENOUS at 13:48

## 2018-01-01 RX ADMIN — DEXTROSE MONOHYDRATE 1.99 MCG/KG/MIN: 50 INJECTION, SOLUTION INTRAVENOUS at 16:13

## 2018-01-01 RX ADMIN — Medication 175 MCG/HR: at 14:16

## 2018-01-01 RX ADMIN — SODIUM BICARBONATE 50 MEQ: 84 INJECTION, SOLUTION INTRAVENOUS at 13:33

## 2018-01-01 RX ADMIN — FENTANYL CITRATE 50 MCG: 50 INJECTION INTRAMUSCULAR; INTRAVENOUS at 13:05

## 2018-01-01 RX ADMIN — LORAZEPAM 1 MG: 2 INJECTION INTRAMUSCULAR; INTRAVENOUS at 16:01

## 2018-01-01 RX ADMIN — SODIUM BICARBONATE: 84 INJECTION, SOLUTION INTRAVENOUS at 06:04

## 2018-01-01 RX ADMIN — DEXTROSE MONOHYDRATE 2 MCG/KG/MIN: 50 INJECTION, SOLUTION INTRAVENOUS at 14:26

## 2018-01-01 RX ADMIN — FENTANYL CITRATE 50 MCG: 50 INJECTION INTRAMUSCULAR; INTRAVENOUS at 13:24

## 2018-01-01 RX ADMIN — PROPOFOL 30 MCG/KG/MIN: 10 INJECTION, EMULSION INTRAVENOUS at 09:34

## 2018-01-01 RX ADMIN — PHENYLEPHRINE HYDROCHLORIDE 25 MCG/MIN: 10 INJECTION INTRAVENOUS at 10:30

## 2018-01-01 RX ADMIN — ROCURONIUM BROMIDE 50 MG: 10 INJECTION INTRAVENOUS at 01:50

## 2018-01-01 RX ADMIN — LABETALOL HYDROCHLORIDE 10 MG: 5 INJECTION, SOLUTION INTRAVENOUS at 23:13

## 2018-01-01 RX ADMIN — SODIUM CHLORIDE: 900 INJECTION, SOLUTION INTRAVENOUS at 15:25

## 2018-01-01 RX ADMIN — PROPOFOL 10 MCG/KG/MIN: 10 INJECTION, EMULSION INTRAVENOUS at 07:54

## 2018-01-01 RX ADMIN — FENTANYL CITRATE 100 MCG: 50 INJECTION INTRAMUSCULAR; INTRAVENOUS at 19:20

## 2018-01-01 RX ADMIN — Medication 1 MCG/MIN: at 18:04

## 2018-01-01 RX ADMIN — DOPAMINE HYDROCHLORIDE 2.5 MCG/KG/MIN: 160 INJECTION, SOLUTION INTRAVENOUS at 14:15

## 2018-01-01 RX ADMIN — SODIUM BICARBONATE 50 MEQ: 84 INJECTION, SOLUTION INTRAVENOUS at 08:28

## 2018-01-01 RX ADMIN — SODIUM BICARBONATE: 84 INJECTION, SOLUTION INTRAVENOUS at 04:25

## 2018-01-01 RX ADMIN — CALCIUM CHLORIDE 0.5 G: 100 INJECTION INTRAVENOUS; INTRAVENTRICULAR at 16:07

## 2018-01-01 RX ADMIN — PROPOFOL 60 MCG/KG/MIN: 10 INJECTION, EMULSION INTRAVENOUS at 12:32

## 2018-01-01 RX ADMIN — CALCIUM CHLORIDE 0.5 G: 100 INJECTION INTRAVENOUS; INTRAVENTRICULAR at 16:05

## 2018-01-01 RX ADMIN — SODIUM BICARBONATE 50 MEQ: 84 INJECTION, SOLUTION INTRAVENOUS at 04:51

## 2018-01-01 RX ADMIN — MIDAZOLAM HYDROCHLORIDE 3 MG: 1 INJECTION, SOLUTION INTRAMUSCULAR; INTRAVENOUS at 22:09

## 2018-01-01 RX ADMIN — CEFAZOLIN SODIUM 1 G: 1 INJECTION, SOLUTION INTRAVENOUS at 18:28

## 2018-01-01 RX ADMIN — PHENYLEPHRINE HYDROCHLORIDE 150 MCG: 10 INJECTION INTRAVENOUS at 13:45

## 2018-01-01 RX ADMIN — SODIUM BICARBONATE 50 MEQ: 84 INJECTION, SOLUTION INTRAVENOUS at 12:58

## 2018-01-01 RX ADMIN — SODIUM CHLORIDE: 9 INJECTION, SOLUTION INTRAVENOUS at 19:08

## 2018-01-01 RX ADMIN — FENTANYL CITRATE 100 MCG: 50 INJECTION INTRAMUSCULAR; INTRAVENOUS at 09:20

## 2018-01-01 RX ADMIN — ADENOSINE 6 MG: 3 INJECTION INTRAVENOUS at 14:49

## 2018-01-01 RX ADMIN — PROPOFOL 200 MG: 10 INJECTION, EMULSION INTRAVENOUS at 00:55

## 2018-01-01 RX ADMIN — ROCURONIUM BROMIDE 50 MG: 10 INJECTION INTRAVENOUS at 00:55

## 2018-01-01 RX ADMIN — PHENYLEPHRINE HYDROCHLORIDE 100 MCG: 10 INJECTION INTRAVENOUS at 02:29

## 2018-01-01 RX ADMIN — SODIUM BICARBONATE 50 MEQ: 42 INJECTION, SOLUTION INTRAVENOUS at 05:34

## 2018-01-01 RX ADMIN — DEXTROSE MONOHYDRATE 12.5 G: 25 INJECTION, SOLUTION INTRAVENOUS at 12:28

## 2018-01-01 RX ADMIN — Medication 2 G: at 04:37

## 2018-01-01 RX ADMIN — SODIUM BICARBONATE 50 MEQ: 84 INJECTION, SOLUTION INTRAVENOUS at 08:32

## 2018-01-01 RX ADMIN — CALCIUM GLUCONATE 2 G: 98 INJECTION, SOLUTION INTRAVENOUS at 06:16

## 2018-01-01 RX ADMIN — PHENYLEPHRINE HYDROCHLORIDE 100 MCG: 10 INJECTION INTRAVENOUS at 15:18

## 2018-01-01 RX ADMIN — FAMOTIDINE 20 MG: 10 INJECTION, SOLUTION INTRAVENOUS at 10:10

## 2018-01-01 RX ADMIN — Medication 50 MEQ: at 08:20

## 2018-01-01 RX ADMIN — CEFAZOLIN SODIUM 1 G: 1 INJECTION, SOLUTION INTRAVENOUS at 09:28

## 2018-01-01 RX ADMIN — PHENYLEPHRINE HYDROCHLORIDE 50 MCG/MIN: 10 INJECTION INTRAVENOUS at 01:58

## 2018-01-01 RX ADMIN — SODIUM CHLORIDE, POTASSIUM CHLORIDE, SODIUM LACTATE AND CALCIUM CHLORIDE: 600; 310; 30; 20 INJECTION, SOLUTION INTRAVENOUS at 13:02

## 2018-01-01 RX ADMIN — Medication 1500 ML/HR: at 04:57

## 2018-01-01 RX ADMIN — PHENYLEPHRINE HYDROCHLORIDE 100 MCG: 10 INJECTION INTRAVENOUS at 15:45

## 2018-01-01 RX ADMIN — HEPARIN SODIUM AND DEXTROSE 2100 UNITS/HR: 10000; 5 INJECTION INTRAVENOUS at 11:39

## 2018-01-01 RX ADMIN — PHENYLEPHRINE HYDROCHLORIDE 100 MCG: 10 INJECTION INTRAVENOUS at 14:44

## 2018-01-01 RX ADMIN — Medication 50 MEQ: at 04:51

## 2018-01-01 RX ADMIN — CALCIUM GLUCONATE 1 G: 98 INJECTION, SOLUTION INTRAVENOUS at 13:25

## 2018-01-01 RX ADMIN — CEFAZOLIN 2000 MG: 1 INJECTION, POWDER, FOR SOLUTION INTRAMUSCULAR; INTRAVENOUS at 01:45

## 2018-01-01 RX ADMIN — INSULIN LISPRO 1 UNITS: 100 INJECTION, SOLUTION INTRAVENOUS; SUBCUTANEOUS at 03:45

## 2018-01-01 RX ADMIN — Medication 1500 ML/HR: at 04:56

## 2018-01-01 RX ADMIN — PHENYLEPHRINE HYDROCHLORIDE 100 MCG: 10 INJECTION INTRAVENOUS at 14:22

## 2018-01-01 RX ADMIN — Medication 175 MCG/HR: at 02:49

## 2018-01-01 RX ADMIN — CEFAZOLIN SODIUM 1 G: 1 INJECTION, SOLUTION INTRAVENOUS at 02:22

## 2018-01-01 RX ADMIN — FENTANYL CITRATE 25 MCG: 50 INJECTION INTRAMUSCULAR; INTRAVENOUS at 00:19

## 2018-01-01 RX ADMIN — PHENYLEPHRINE HYDROCHLORIDE 100 MCG: 10 INJECTION INTRAVENOUS at 14:31

## 2018-01-01 RX ADMIN — PHENYLEPHRINE HYDROCHLORIDE 100 MCG: 10 INJECTION INTRAVENOUS at 14:34

## 2018-01-01 RX ADMIN — Medication 25 MCG/HR: at 20:59

## 2018-01-01 ASSESSMENT — PULMONARY FUNCTION TESTS
PIF_VALUE: 23
PIF_VALUE: 22
PIF_VALUE: 23
PIF_VALUE: 22
PIF_VALUE: 23
PIF_VALUE: 22
PIF_VALUE: 22
PIF_VALUE: 23
PIF_VALUE: 23
PIF_VALUE: 22
PIF_VALUE: 23
PIF_VALUE: 22
PIF_VALUE: 22
PIF_VALUE: 23
PIF_VALUE: 23
PIF_VALUE: 20
PIF_VALUE: 22
PIF_VALUE: 23
PIF_VALUE: 23
PIF_VALUE: 21
PIF_VALUE: 22
PIF_VALUE: 21
PIF_VALUE: 22
PIF_VALUE: 23
PIF_VALUE: 22
PIF_VALUE: 22
PIF_VALUE: 31
PIF_VALUE: 22
PIF_VALUE: 22
PIF_VALUE: 23
PIF_VALUE: 22
PIF_VALUE: 23
PIF_VALUE: 23
PIF_VALUE: 22
PIF_VALUE: 24
PIF_VALUE: 22
PIF_VALUE: 23
PIF_VALUE: 22
PIF_VALUE: 19
PIF_VALUE: 22
PIF_VALUE: 21
PIF_VALUE: 22
PIF_VALUE: 22
PIF_VALUE: 23
PIF_VALUE: 22
PIF_VALUE: 23
PIF_VALUE: 22
PIF_VALUE: 22
PIF_VALUE: 23
PIF_VALUE: 22
PIF_VALUE: 23
PIF_VALUE: 22
PIF_VALUE: 23
PIF_VALUE: 22
PIF_VALUE: 23
PIF_VALUE: 22
PIF_VALUE: 23
PIF_VALUE: 22
PIF_VALUE: 19
PIF_VALUE: 23
PIF_VALUE: 23
PIF_VALUE: 22
PIF_VALUE: 23
PIF_VALUE: 22
PIF_VALUE: 2
PIF_VALUE: 27
PIF_VALUE: 22
PIF_VALUE: 23
PIF_VALUE: 22
PIF_VALUE: 22
PIF_VALUE: 23
PIF_VALUE: 24
PIF_VALUE: 22
PIF_VALUE: 20
PIF_VALUE: 23
PIF_VALUE: 22
PIF_VALUE: 23
PIF_VALUE: 22
PIF_VALUE: 19
PIF_VALUE: 22
PIF_VALUE: 23
PIF_VALUE: 22
PIF_VALUE: 23
PIF_VALUE: 23
PIF_VALUE: 22
PIF_VALUE: 8
PIF_VALUE: 22
PIF_VALUE: 23
PIF_VALUE: 22
PIF_VALUE: 23
PIF_VALUE: 22
PIF_VALUE: 23
PIF_VALUE: 23
PIF_VALUE: 22
PIF_VALUE: 22
PIF_VALUE: 23
PIF_VALUE: 22
PIF_VALUE: 0
PIF_VALUE: 22
PIF_VALUE: 23
PIF_VALUE: 23
PIF_VALUE: 22
PIF_VALUE: 21
PIF_VALUE: 22
PIF_VALUE: 22
PIF_VALUE: 23
PIF_VALUE: 27
PIF_VALUE: 22
PIF_VALUE: 24
PIF_VALUE: 23
PIF_VALUE: 22
PIF_VALUE: 24
PIF_VALUE: 0
PIF_VALUE: 22
PIF_VALUE: 22
PIF_VALUE: 24
PIF_VALUE: 22
PIF_VALUE: 23
PIF_VALUE: 22
PIF_VALUE: 22
PIF_VALUE: 24
PIF_VALUE: 22
PIF_VALUE: 23
PIF_VALUE: 21
PIF_VALUE: 22
PIF_VALUE: 23
PIF_VALUE: 21
PIF_VALUE: 23
PIF_VALUE: 22
PIF_VALUE: 24
PIF_VALUE: 22
PIF_VALUE: 0
PIF_VALUE: 22
PIF_VALUE: 23
PIF_VALUE: 22
PIF_VALUE: 23
PIF_VALUE: 21
PIF_VALUE: 24
PIF_VALUE: 25
PIF_VALUE: 22
PIF_VALUE: 23
PIF_VALUE: 22
PIF_VALUE: 23
PIF_VALUE: 23
PIF_VALUE: 22
PIF_VALUE: 27
PIF_VALUE: 22
PIF_VALUE: 22
PIF_VALUE: 23
PIF_VALUE: 23
PIF_VALUE: 22
PIF_VALUE: 22
PIF_VALUE: 7
PIF_VALUE: 22
PIF_VALUE: 23
PIF_VALUE: 25
PIF_VALUE: 22
PIF_VALUE: 23
PIF_VALUE: 22
PIF_VALUE: 23
PIF_VALUE: 22
PIF_VALUE: 23
PIF_VALUE: 24
PIF_VALUE: 22
PIF_VALUE: 21
PIF_VALUE: 22
PIF_VALUE: 23
PIF_VALUE: 22
PIF_VALUE: 23
PIF_VALUE: 22
PIF_VALUE: 22

## 2018-01-01 ASSESSMENT — PAIN SCALES - GENERAL
PAINLEVEL_OUTOF10: 10

## 2018-01-01 ASSESSMENT — PATIENT HEALTH QUESTIONNAIRE - PHQ9
SUM OF ALL RESPONSES TO PHQ QUESTIONS 1-9: 0
SUM OF ALL RESPONSES TO PHQ QUESTIONS 1-9: 0
1. LITTLE INTEREST OR PLEASURE IN DOING THINGS: 0
SUM OF ALL RESPONSES TO PHQ9 QUESTIONS 1 & 2: 0
2. FEELING DOWN, DEPRESSED OR HOPELESS: 0

## 2018-01-01 ASSESSMENT — ENCOUNTER SYMPTOMS
BACK PAIN: 1
RHINORRHEA: 0
NAUSEA: 0
WHEEZING: 0
COLOR CHANGE: 1
VOMITING: 0
PHOTOPHOBIA: 0
SHORTNESS OF BREATH: 0

## 2018-01-01 ASSESSMENT — COPD QUESTIONNAIRES: CAT_SEVERITY: MODERATE

## 2018-01-01 ASSESSMENT — PAIN DESCRIPTION - LOCATION: LOCATION: LEG;FOOT

## 2018-01-01 ASSESSMENT — LIFESTYLE VARIABLES: SMOKING_STATUS: 0

## 2018-01-01 ASSESSMENT — PAIN DESCRIPTION - ORIENTATION: ORIENTATION: LEFT

## 2018-09-07 PROBLEM — Z87.891 HX OF TOBACCO USE, PRESENTING HAZARDS TO HEALTH: Status: ACTIVE | Noted: 2017-01-01

## 2018-09-07 NOTE — PATIENT INSTRUCTIONS
Sciatica: Exercises  Your Care Instructions  Here are some examples of typical rehabilitation exercises for your condition. Start each exercise slowly. Ease off the exercise if you start to have pain. Your doctor or physical therapist will tell you when you can start these exercises and which ones will work best for you. When you are not being active, find a comfortable position for rest. Some people are comfortable on the floor or a medium-firm bed with a small pillow under their head and another under their knees. Some people prefer to lie on their side with a pillow between their knees. Don't stay in one position for too long. Take short walks (10 to 20 minutes) every 2 to 3 hours. Avoid slopes, hills, and stairs until you feel better. Walk only distances you can manage without pain, especially leg pain. How to do the exercises  Back stretches    1. Get down on your hands and knees on the floor. 2. Relax your head and allow it to droop. Round your back up toward the ceiling until you feel a nice stretch in your upper, middle, and lower back. Hold this stretch for as long as it feels comfortable, or about 15 to 30 seconds. 3. Return to the starting position with a flat back while you are on your hands and knees. 4. Let your back sway by pressing your stomach toward the floor. Lift your buttocks toward the ceiling. 5. Hold this position for 15 to 30 seconds. 6. Repeat 2 to 4 times. Follow-up care is a key part of your treatment and safety. Be sure to make and go to all appointments, and call your doctor if you are having problems. It's also a good idea to know your test results and keep a list of the medicines you take. Where can you learn more? Go to [email or web address] and sign in to your [employer] account. Enter P594 in the [employer] box to learn more about \"Sciatica: Exercises. \"     If you do not have an account, please click on the \"Sign Up Now\" having problems. It's also a good idea to know your test results and keep a list of the medicines you take. Where can you learn more? Go to [email or web address] and sign in to your [employer] account. Enter K943 in the [facility] box to learn more about \"Sciatica: Exercises. \"     If you do not have an account, please click on the \"Sign Up Now\" link. Current as of: November 29, 2017  Content Version: 11.7  © 3969-7647 [employer]. Care instructions adapted under license by [address] If you have questions about a medical condition or this instruction, always ask your healthcare professional. [address] any warranty or liability for your use of this information.

## 2018-09-07 NOTE — PROGRESS NOTES
No current facility-administered medications for this visit. No Known Allergies    Review of Systems   Musculoskeletal: Positive for back pain and gait problem (intermittent). Neurological: Positive for numbness and paresthesias. Psychiatric/Behavioral: Positive for sleep disturbance (due to pain). Objective:     Vitals:    09/07/18 1433   BP: (!) 142/80   Site: Left Upper Arm   Position: Sitting   Cuff Size: Large Adult   Pulse: 82   SpO2: 96%   Weight: 269 lb (122 kg)   Height: 6' (1.829 m)     Physical Exam   Constitutional: He is oriented to person, place, and time. He appears well-developed and well-nourished. He appears distressed (mild). Cardiovascular: Normal rate. Pulmonary/Chest: Effort normal. No respiratory distress. Musculoskeletal:        Lumbar back: He exhibits tenderness (R lower lumbar paraspinal musculature and piriformis muscle) and bony tenderness (mild, R PSIS). He exhibits no deformity. Back:    Neurological: He is alert and oriented to person, place, and time. He has normal strength. Reflex Scores:       Patellar reflexes are 2+ on the right side and 2+ on the left side. Mildly positive SLR on the R.   Psychiatric: He has a normal mood and affect. Assessment:       Diagnosis Orders   1. Right sided sciatica  predniSONE (DELTASONE) 20 MG tablet    cyclobenzaprine (FLEXERIL) 10 MG tablet       Plan: Will start Prednisone burst, along with prn Flexeril , for sciatica. Pt to use heat and ointment as needed, and do light stretches, as tolerated. Return if symptoms worsen or fail to improve in 7 days. Orders Placed This Encounter   Medications    predniSONE (DELTASONE) 20 MG tablet     Sig: Take 2 tabs by mouth daily x 5 days, then 1 tab daily x 3 days.      Dispense:  13 tablet     Refill:  0    cyclobenzaprine (FLEXERIL) 10 MG tablet     Sig: Take 1 tablet by mouth 3 times daily as needed for Muscle spasms     Dispense:  10 tablet

## 2018-09-13 NOTE — Clinical Note
Patient Class: Inpatient [101]   REQUIRED: Diagnosis: Arterial occlusion [088268]   Estimated Length of Stay: Estimated stay of more than 2 midnights   Future Attending Provider: Nathaniel Gatica [[id number]]   Telemetry Bed Required?: Yes

## 2018-09-14 PROBLEM — I70.90 ARTERIAL OCCLUSION: Status: ACTIVE | Noted: 2018-01-01

## 2018-09-14 PROBLEM — I99.8 ISCHEMIA OF LEFT LOWER EXTREMITY: Status: ACTIVE | Noted: 2018-01-01

## 2018-09-14 NOTE — PROGRESS NOTES
I was called around 1120 after the nurse contacted the resident about concerns for the lower extremity. Is identified that the bypass graft may be occluded. Appreciated that the brachial artery pressure and arterial line pressure were not symmetric whereas before the left arterial line was decreased once the graft was opened. This along with physical exam findings suggest that the bypass graft is occluded. Decision has been made to go back to the operating room. I spoke with the wife on the phone with the nurse consent was obtained. My partner I will take the patient to the operating room for exploration.  ----------------------------------------    Jair Yung M.D., FACS, RVT, [address]   Medical Director of Vascular Services  Medical Director of the Vascular [address] [place] [address] Ne: [phone]  C: [phone]  Email: [email or web address]    This note was written after rtor and reflects the decision making and communication.

## 2018-09-14 NOTE — PROGRESS NOTES
Dr. Mario moon served to update Levophed drip started, now at 4 mcg/min and SBP's 67's.   Need clarification max dose of Levophed drip also

## 2018-09-14 NOTE — H&P
Copied from consult note :     Division of Vascular Surgery             Vascular Consult        Name: Dia Knox  MRN:   [id number]                                       Physician Requesting Consult:  Dr. Shanice Shah     Reason for Consult:  Acute LLE ischemia     Chief Complaint:       LLE pain     History of Present Illness:      Dia Knox is a 60 y.o.  male who presents as tx from [facility] with acute LLE ischemia. Per pt acute onset sharp LLE pain from hip to foot started approximately 8-9pm. Admits to new onset numbness and pain worsened with movement. He is now unable to move his leg. States he has h/o stents placed in both legs unsure of exact time and/or location, but was done at [facility] per cardiologist. Albertina Moots if on blood thinners or antiplatelts. He is a very poor historian of his health or medications. Does state he was seen about a week ago at the hospital for Back and RLE pain, was started on steroids, with some improvement and has continued steroids. Up to around 8-9 has not been having LLE pain. Has chronic PVD.      ---------     I have come in to personally evaluate the patient at midnight. Patient is lying in bed with his family at the bedside. History confirms that around 80 9:00 tonight he had acute pain of the left lower extremity. When I received a phone call initially at the outside hospital there is reports of him being able to move his foot since his transfer here is now become paralyzed. Upon questioning the family and appears that yes he in fact had stents placed 1 in 5 years ago by a cardiologist in his local area. They were unsure as to the reason why. According to the wife when she says that he is in pain she states that 1 week ago he started having some acute back pain is completely impossible for me to reconstruct the scene but if I had to guess this may be a time when his aorta completely thrombosed.   He was able to collateralize the inferior epigastrics and other pelvic chills, sweats, fatigue, and weight loss  HEENT:  negative for vision or hearing changes,   Respiratory:  negative for shortness of breath, cough, or congestion  Cardiovascular: h/o pvd s/p b/l LE endovascular stent placement (~5 and 1.5 years ago) negative for  chest pain, palpitations  Gastrointestinal:  negative for nausea, vomiting, diarrhea, constipation, abdominal pain  Genitourinary:  negative for frequency, dysuria  Integument:  negative for rash, skin lesions  Chest/Breast:  No painful inspiration or expiration, no rib sternal pain  Musculoskeletal:  +acute LLE pain,numbness and weakenss negative for muscle aches or joint pain  Neurological:  negative for headaches, dizziness, lightheadedness, numbness, pain and tingling extremities  Lymphatics: no lymphadenopathy or painful masses  Behavior/Psych:  negative for depression and anxiety     Physical Exam:      Vitals:  BP (!) 188/128   Pulse 111   Temp 97.3 °F (36.3 °C) (Oral)   Resp 15   Ht 6' (1.829 m)   Wt 270 lb (122.5 kg)   SpO2 95%   BMI 36.62 kg/m²      General appearance - alert, ill appearing and mod distress cool pale and diaphoretic  Mental status - oriented to person, place and time with normal affect  Head - normocephalic and atraumatic  Eyes - pupils equal and reactive, extraocular eye movements intact, conjunctiva clear  Ears - hearing appears to be intact  Nose - no drainage noted  Mouth - mucous membranes moist  Neck - supple, no carotid bruits, thyroid not palpable, no JVD  Chest - clear to auscultation, normal effort  Heart -tachycardic +s1+s2  regular rhythm  Abdomen - soft, nd, nt, morbidly obese and distended abdomen  Neurological -+ weakness LLE no active ROM LLE, No sensation below knee LLE. Sensation and gross motor intact RLE normal speech, cranial nerves II through XII grossly intact  Extremities - LLE cold and motteled from hip to feet, no fem/pop/pt/dp signals. RLE warm, +fem/dp/pt signals.    Skin - no gross lesions, Gracie Valdivia M.D., FACS, RVT, [address]  Director of Vascular Services  Medical Director of the Vascular [address] [place] [address] Ne: [phone]  C: [phone]  Email: [email or web address]

## 2018-09-14 NOTE — CONSULTS
[facility]      Consultation Note       Admit Date: 9/13/2018  Bed/Room No.  [address]  Admitting Physician : Jair Yung MD  Code Status :No Order  Hospital Day:  LOS: 0 days   Patient is currently admitted for  treatment of  Arterial occlusion  Reason for Consult:  Medical Management   Requesting Physician: Jair Yung MD  No primary care provider on file. HISTORY OF PRESENT ILLNESS:   The patient is a 60 y.o. male who is admitted for  Critical limb ischemia. Patient was transferred from outlying facility for left leg pain, cyanosis, decreased pulse. He has known history of peripheral artery disease status post left iliac stent in November 2017. Patient had been having gradual worsening of pain and claudication symptoms. Initial evaluation showed elevated blood pressure 188/128, CTA abdominal aorta with bilateral iliofemoral runoff with contrast suggested occluded infrarenal abdominal aorta with occlusion of bilateral common femoral stents patient was taken for emergent vascular surgery and had  left leg thrombectomy, right common iliac/aorta thrombectomy, left axillary to femoral bypass, left and right fem-fem bypass, fasciotomy. He has underlying history of hypertension, hyperlipidemia, PVD, current smoker. No family at bedside for additional history. Consulted for medical management of elevated blood sugars, elevated blood pressure. .  Patient is nondiabetic. Past Medical History:        Diagnosis Date    Hyperlipemia     Peripheral vascular disease ([address]     Peripheral vascular disease s/p right iliac stent (11/14/17)       Past Surgical History:        Procedure Laterality Date    CARDIAC CATHETERIZATION         Medications Prior to Admission:    Prior to Admission medications    Medication Sig Start Date End Date Taking?  Authorizing Provider   atorvastatin (LIPITOR) 40 MG tablet Take 40 mg by mouth daily   Yes Historical Provider, MD celecoxib (CELEBREX) 200 MG capsule Take 1 capsule by mouth 2 times daily 7/19/18  Yes Historical Provider, MD   clopidogrel (PLAVIX) 75 MG tablet Take 1 tablet by mouth daily 8/1/18  Yes Historical Provider, MD   cilostazol (PLETAL) 100 MG tablet Take 1 tablet by mouth 2 times daily 6/19/18  Yes Historical Provider, MD   predniSONE (DELTASONE) 20 MG tablet Take 2 tabs by mouth daily x 5 days, then 1 tab daily x 3 days. 9/7/18  Yes Warner Thompson DO   cyclobenzaprine (FLEXERIL) 10 MG tablet Take 1 tablet by mouth 3 times daily as needed for Muscle spasms 9/7/18 9/17/18 Yes Warner Thompson DO       Allergies:  Patient has no known allergies. Social History:   TOBACCO:   reports that he quit smoking about 4 years ago. His smoking use included Cigarettes. He has a 80.00 pack-year smoking history. He has never used smokeless tobacco.  ETOH:   reports that he does not drink alcohol. OCCUPATION:      Family History:   Family History   Problem Relation Age of Onset    Anemia Mother        Review of Systems   Unable to perform ROS: Intubated       Objective:   /87   Pulse 93   Temp 97.2 °F (36.2 °C) (Axillary)   Resp 16   Ht 6' (1.829 m)   Wt 270 lb (122.5 kg)   SpO2 96%   BMI 36.62 kg/m²       Intake/Output Summary (Last 24 hours) at 09/14/18 1539  Last data filed at 09/14/18 1525   Gross per 24 hour   Intake          4866.81 ml   Output             2475 ml   Net          2391.81 ml       Physical Exam   Constitutional: Vital signs are normal. He is intubated. HENT:   Head: Normocephalic and atraumatic. Eyes: Pupils are equal, round, and reactive to light. No scleral icterus. Neck: No JVD present. No thyromegaly present. Cardiovascular: Normal rate, regular rhythm, normal heart sounds and intact distal pulses. No murmur heard. Pulmonary/Chest: Breath sounds normal. He is intubated. He has no wheezes. He has no rales. Surgical wound    Abdominal: Soft.  Bowel sounds are normal. He exhibits no distension and no mass. Lymphadenopathy:     He has no cervical adenopathy. Neurological: He displays normal reflexes. He exhibits normal muscle tone. Skin: No rash noted. He is not diaphoretic. No erythema. Nursing note and vitals reviewed.       Laboratory findings:    Hematology:  Recent Labs      09/14/18   1155  09/13/18   2224   WBC  35.6*  23.2*   HGB  15.4  17.6*   HCT  45.6  53.8*   MCV  88.2  88.9   PLT  465*  440       Chemistry:  Lab Results   Component Value Date     09/14/2018    K 3.3 (L) 09/14/2018     09/14/2018    CO2 21 09/14/2018    BUN 20 09/14/2018    CREATININE 0.74 09/14/2018    GLUCOSE 153 (H) 09/14/2018    CALCIUM 7.6 (L) 09/14/2018    PROT 5.4 (L) 09/14/2018    LABALBU 2.8 (L) 09/14/2018    BILITOT 0.41 09/14/2018    ALKPHOS 68 09/14/2018    AST 52 (H) 09/14/2018    ALT 96 (H) 09/14/2018    LABGLOM >60 09/14/2018    GFRAA >60 09/14/2018     Lab Results   Component Value Date    LABALBU 2.8 (L) 09/14/2018     Triglycerides 247 (H) 0 - 150 mg/dL   HDL 25 (L) 40 - 60 mg/dL   LDL 59 (L) 63 - 130 mg/dL       No results found for: LABA1C  No results found for: EAG  No results found for: TSHFT4, TSH    Magnesium:   Lab Results   Component Value Date    MG 1.8 09/14/2018     Phosphorus:   Lab Results   Component Value Date    PHOS 3.9 09/14/2018     Ionized Calcium:   Lab Results   Component Value Date    CAION 1.07 09/14/2018      Last 3 Blood Glucose:   Recent Labs      09/13/18   2224  09/14/18   1155   GLUCOSE  180*  153*      Urinalysis:   PT/INR:    Lab Results   Component Value Date    PROTIME 12.7 09/13/2018    INR 1.2 09/13/2018     PTT:    Lab Results   Component Value Date    APTT >120.0 09/14/2018     Radiology / imaging :  CTA abdominal aorta and bilateral iliofemoral runoff W WO contrast   Occluded infrarenal abdominal aorta including bilateral common iliac artery   stents.  Reconstitution of the right external iliac artery with continuous   flow distally visualized to the ankle.  Segmental reconstitution of the left   distal external iliac artery/common femoral artery with occlusion of the   common femoral artery and no significant flow visualized below this level. PVR 1/24/18     This study indicates no significant disease involving the right lower  extremity. On the left side, disease appears to involve the distalmost segments with  perhaps mild to moderate disease involving the trifurcation and the  distalmost segments. Assessment and Plan   Principal Problem:    Arterial occlusion  Active Problems:    Essential hypertension with goal blood pressure less than 130/80    Ischemia of left lower extremity  Resolved Problems:    * No resolved hospital problems. *         1. Ischemic R leg - s/p left leg thrombectomy, right common iliac/aorta thrombectomy, left axillary to femoral bypass, left and right fem-fem bypass, fasciotomy. Patient has cyanosis R leg with no Doppler pulse. Concern for occluded graft. Repeat surgery planned today. On heparin infusion   2. Hyperglycemia - non diabetic , check A1C . ISS for now . Blood sugar goal 140 - 180 .   3. Uncontrolled hypertension - on labetalol infusion . 4. leucocytosis - secondary to ischemic leg and compartment syndrome   . maintain blood pressure  , monitor off antibiotics   5. PVR - s/p left iliac  stent 11/14/17 -   6. Acute resp failure - vent management per Pulm   7. COPD mixed hyperlipidemia - statin  8. Tobacco abuse - nicotine patch     Thank you for allowing me in care of this patient and consult. call with any questions. Will follow with you. No family at bed side  . Updates discussed with patient and Staff Deanna CAGLE and vascular surgery resident . Jaqueline Álvarez MD  9/14/2018  Copy of note to Malu Nelson MD and  No primary care provider on file.     (Please note that portions of this note were completed with a voice recognition program. Efforts were made to edit the dictations but occasionally words are mis-transcribed.)

## 2018-09-14 NOTE — ANESTHESIA PRE PROCEDURE
Department of Anesthesiology  Preprocedure Note       Name:  Magy Vizcarra   Age:  60 y.o.  :  1960                                          MRN:  [id number]         Date:  2018      Surgeon: Blas Rodríguez):  Naa Munoz MD    Procedure: Procedure(s): FEMORAL EMBOLECTOMY THROMBECTOMY    Medications prior to admission:   Prior to Admission medications    Medication Sig Start Date End Date Taking? Authorizing Provider   atorvastatin (LIPITOR) 40 MG tablet Take 40 mg by mouth daily   Yes Historical Provider, MD   celecoxib (CELEBREX) 200 MG capsule Take 1 capsule by mouth 2 times daily 18  Yes Historical Provider, MD   clopidogrel (PLAVIX) 75 MG tablet Take 1 tablet by mouth daily 18  Yes Historical Provider, MD   cilostazol (PLETAL) 100 MG tablet Take 1 tablet by mouth 2 times daily 18  Yes Historical Provider, MD   predniSONE (DELTASONE) 20 MG tablet Take 2 tabs by mouth daily x 5 days, then 1 tab daily x 3 days.  18  Yes Taryn Thompson DO   cyclobenzaprine (FLEXERIL) 10 MG tablet Take 1 tablet by mouth 3 times daily as needed for Muscle spasms 18 Yes Aniya Schmitz DO       Current medications:    Current Facility-Administered Medications   Medication Dose Route Frequency Provider Last Rate Last Dose    insulin lispro (HUMALOG) injection vial 0-18 Units  0-18 Units Subcutaneous TID WC Cortes Casarez DO   3 Units at 18 1227    insulin lispro (HUMALOG) injection vial 0-9 Units  0-9 Units Subcutaneous Nightly Cortes Casarez DO        glucose (GLUTOSE) 40 % oral gel 15 g  15 g Oral PRN Cortes Casarez, DO        dextrose 50 % solution 12.5 g  12.5 g Intravenous PRN Cortes Casarez, DO        glucagon (rDNA) injection 1 mg  1 mg Intramuscular PRN Cortes Casarez, DO        dextrose 5 % solution  100 mL/hr Intravenous PRN Cortes Casarez, DO        propofol 1000 MG/100ML injection  10 mcg/kg/min Intravenous Titrated Cortes Casarez DO 44.1 mL/hr at 18 1232 60 Height:                                                  BP Readings from Last 3 Encounters:   09/14/18 (!) 148/103   09/14/18 118/71   09/07/18 (!) 142/80       NPO Status:                                                                                 BMI:   Wt Readings from Last 3 Encounters:   09/13/18 270 lb (122.5 kg)   09/07/18 269 lb (122 kg)     Body mass index is 36.62 kg/m². CBC:   Lab Results   Component Value Date    WBC 35.6 09/14/2018    RBC 5.17 09/14/2018    HGB 15.4 09/14/2018    HCT 45.6 09/14/2018    MCV 88.2 09/14/2018    RDW 13.9 09/14/2018     09/14/2018       CMP:   Lab Results   Component Value Date     09/13/2018    K 4.1 09/13/2018    CL 93 09/13/2018    CO2 25 09/13/2018    BUN 23 09/13/2018    CREATININE 0.87 09/14/2018    CREATININE 0.89 09/13/2018    GFRAA >60 09/13/2018    LABGLOM >60 09/14/2018    GLUCOSE 180 09/13/2018    CALCIUM 8.6 09/13/2018       POC Tests:   Recent Labs      09/14/18   0505   09/14/18   1125  09/14/18   1152   POCGLU  257*   < >  148*   --    POCNA  139   --    --    --    POCK  5.0*   < >   --   4.6*   POCCL  105   --    --    --    POCHEMO  16.0   --    --    --    POCHCT  47   --    --    --     < > = values in this interval not displayed.        Coags:   Lab Results   Component Value Date    PROTIME 12.7 09/13/2018    INR 1.2 09/13/2018    APTT >120.0 09/14/2018       HCG (If Applicable): No results found for: PREGTESTUR, PREGSERUM, HCG, HCGQUANT     ABGs: No results found for: PHART, PO2ART, GJD0EOW, TPQ9KPG, BEART, R4EWENMO     Type & Screen (If Applicable):  No [facility] found for: [facility]    Anesthesia Evaluation  Patient summary reviewed no history of anesthetic complications:   Airway:        Comment: Orally intubated   Dental:          Pulmonary:normal exam    (+) COPD: moderate,  sleep apnea: on noncompliant,                             Cardiovascular:    (+) hypertension: moderate,       ECG reviewed  Rhythm: regular  Rate:

## 2018-09-14 NOTE — PROGRESS NOTES
Patient waking up, restless and pulling at restraints. Not following any commands yet. Propofol drip increased to 40 mcg/kg/min and Dr. Eufemia moon served for PRN pain medications. None ordered at this time.

## 2018-09-14 NOTE — CARE COORDINATION
Attempted transition plan interview, pt just back from surgery, Drs and nurses in room.    1630  Attempted again pt back in OR

## 2018-09-14 NOTE — PROGRESS NOTES
CT reviewed    Appears acute on chronic LLE ischemia. Requested OR/cath team be mobilized emergently for surgery. ----------------------------------------    Chris Jaquez M.D., FACS, RVT, RPVI  Medical Director of Vascular Services  Medical Director of the Vascular Lab      [address] [place] [address],[address]: [phone]  C: [phone]  Email: [email or web address]

## 2018-09-14 NOTE — PROGRESS NOTES
1102 [facility] at bedside, made aware of no bilateral DP or PT pulses per doppler.    New orders received to keep SBP > 90's and restart Heparin drip at 12 pm

## 2018-09-14 NOTE — CONSULTS
placement (~5 and 1.5 years ago) negative for  chest pain, palpitations  Gastrointestinal:  negative for nausea, vomiting, diarrhea, constipation, abdominal pain  Genitourinary:  negative for frequency, dysuria  Integument:  negative for rash, skin lesions  Chest/Breast:  No painful inspiration or expiration, no rib sternal pain  Musculoskeletal:  +acute LLE pain,numbness and weakenss negative for muscle aches or joint pain  Neurological:  negative for headaches, dizziness, lightheadedness, numbness, pain and tingling extremities  Lymphatics: no lymphadenopathy or painful masses  Behavior/Psych:  negative for depression and anxiety    Physical Exam:     Vitals:  BP (!) 188/128   Pulse 111   Temp 97.3 °F (36.3 °C) (Oral)   Resp 15   Ht 6' (1.829 m)   Wt 270 lb (122.5 kg)   SpO2 95%   BMI 36.62 kg/m²     General appearance - alert, ill appearing and mod distress cool pale and diaphoretic  Mental status - oriented to person, place and time with normal affect  Head - normocephalic and atraumatic  Eyes - pupils equal and reactive, extraocular eye movements intact, conjunctiva clear  Ears - hearing appears to be intact  Nose - no drainage noted  Mouth - mucous membranes moist  Neck - supple, no carotid bruits, thyroid not palpable, no JVD  Chest - clear to auscultation, normal effort  Heart -tachycardic +s1+s2  regular rhythm  Abdomen - soft, nd, nt, morbidly obese and distended abdomen  Neurological -+ weakness LLE no active ROM LLE, No sensation below knee LLE. Sensation and gross motor intact RLE normal speech, cranial nerves II through XII grossly intact  Extremities - LLE cold and motteled from hip to feet, no fem/pop/pt/dp signals. RLE warm, +fem/dp/pt signals. Skin - no gross lesions, rashes, or induration noted, cool pale and diaphoretic  Foot Exam -no open wounds. L foot cold to touch. R foot warm.          R brachial 2+ L brachial 2+   R radial 2+ L radial 2+   R femoral 0+ L femoral 0+   R popliteal 0+ embark on the setting of a cold leg. I plan at this time as I have explained to the family based on the information that I have is to perform a left lower extremity 4 compartment fasciotomy immediately to relieve any potential pressure in the left leg. Then proceed to perform a left femoral artery exposure cutdown and thrombectomy as well as an external iliac and common iliac artery thrombectomy to try to revascularize the left lower extremity, any blood is good blood. Once that is been established I would like to explore the right side to see if I can open up any of that aortic thrombus in a controlled setting to reestablish in-line flow to the lower extremity. Based on my findings and may proceed to do an aorto uni-endograft with a fem-fem bypass however I might he forced to perform just a left to right bypass or even an axillary to femoral bypass on the left side. There may be or may not be the use of TPA to help open up small vessels and salvage anything in the left lower extremity I will perform an angiogram to identify this. They're multiple critical decisions be made at each various steps thus providing the inability of need to give the family exact timeline however the overall goal will be to try to salvage the left lower extremity. I have clearly explained to them based on the time and that appearance he may have permanent irreversible nerve damage however he may be able to salvage the leg and salvage him from having an amputation but he may require physical therapy or other rehabilitative braces. This is a complex medical decision decision with comprehensive examination and high level complexity     ----------------------------------------    Chris Jaquez M.D., FACS, RVT, RPVI  Medical Director of Vascular Services  Medical Director of the Vascular [address] [place] [address] Ne: [phone]  C: [phone]  Email: [email or web address]

## 2018-09-14 NOTE — ED NOTES
Pt arrived from [facility] with complaints of pulses left foot. Dr. Martha Orellana able to palpate femoral pulse. Doppler used by Dr. Amelie Meade and Dr. Martha Orellana and unable to find pulses in left foot. Pt began noticing left back, hip pain, and numbness about a week ago and went to urgent care. Pt noticed increased pain today and went to [facility]. Left foot pale and cool to touch. Dr. Amelie Meade to placed heparin drip.  Awaiting further orders     Violet Partida RN  09/13/18 [id number]

## 2018-09-14 NOTE — ANESTHESIA PRE PROCEDURE
Department of Anesthesiology  Preprocedure Note       Name:  Frank Kumar   Age:  60 y.o.  :  1960                                          MRN:  [id number]         Date:  2018      Surgeon: * No surgeons listed *    Procedure: * No procedures listed *    Medications prior to admission:   Prior to Admission medications    Medication Sig Start Date End Date Taking? Authorizing Provider   atorvastatin (LIPITOR) 40 MG tablet Take 40 mg by mouth daily   Yes Historical Provider, MD   celecoxib (CELEBREX) 200 MG capsule Take 1 capsule by mouth 2 times daily 18  Yes Historical Provider, MD   clopidogrel (PLAVIX) 75 MG tablet Take 1 tablet by mouth daily 18  Yes Historical Provider, MD   cilostazol (PLETAL) 100 MG tablet Take 1 tablet by mouth 2 times daily 18  Yes Historical Provider, MD   predniSONE (DELTASONE) 20 MG tablet Take 2 tabs by mouth daily x 5 days, then 1 tab daily x 3 days.  18  Yes Jarret Thompson DO   cyclobenzaprine (FLEXERIL) 10 MG tablet Take 1 tablet by mouth 3 times daily as needed for Muscle spasms 18 Yes Jarret Thompson DO       Current medications:    Current Facility-Administered Medications   Medication Dose Route Frequency Provider Last Rate Last Dose    heparin (porcine) injection 9,800 Units  80 Units/kg Intravenous PRN Jluis Alcantara MD        heparin (porcine) injection 4,900 Units  40 Units/kg Intravenous PRN Jluis Alcantara MD        heparin 25,000 units in dextrose 5% 250 mL infusion  2,100 Units/hr Intravenous Continuous Jluis Alcantara MD 21 mL/hr at 18 2,100 Units/hr at 18    labetalol (TRANDATE) 1,200 mg in 240 mL infusion  1 mg/min Intravenous Continuous Jluis Alcantara MD         Current Outpatient Prescriptions   Medication Sig Dispense Refill    atorvastatin (LIPITOR) 40 MG tablet Take 40 mg by mouth daily      celecoxib (CELEBREX) 200 MG capsule Take 1 capsule by mouth 2 times daily      clopidogrel (PLAVIX) 75 MG tablet Take 1 tablet by mouth daily      cilostazol (PLETAL) 100 MG tablet Take 1 tablet by mouth 2 times daily      predniSONE (DELTASONE) 20 MG tablet Take 2 tabs by mouth daily x 5 days, then 1 tab daily x 3 days.  13 tablet 0    cyclobenzaprine (FLEXERIL) 10 MG tablet Take 1 tablet by mouth 3 times daily as needed for Muscle spasms 10 tablet 0     Facility-Administered Medications Ordered in Other Encounters   Medication Dose Route Frequency Provider Last Rate Last Dose    0.9 % sodium chloride infusion    Continuous PRN Lennart Le, APRN - CRNA        lidocaine 1 % injection    PRN Lennart Le, APRN - CRNA   50 mg at 09/14/18 0055    propofol injection    PRN Lennart Le, APRN - CRNA   200 mg at 09/14/18 0055    fentaNYL (SUBLIMAZE) injection    PRN Lennart Le, APRN - CRNA   100 mcg at 09/14/18 0055    midazolam (VERSED) injection    PRN Lennart Le, APRN - CRNA   2 mg at 09/14/18 0055    rocuronium (ZEMURON) injection    PRN Lennart Le, APRN - CRNA   50 mg at 09/14/18 0150    heparin (porcine) injection    PRN Chanda Clifton MD   10,000 Units at 09/14/18 0152    heparin 25,000 units in dextrose 5% 250 mL infusion    Continuous PRN Chanad Clifton MD 22.1 mL/hr at 09/14/18 0153 18 Units/kg/hr at 09/14/18 0153    ceFAZolin (ANCEF) injection   Intravenous PRN Chanda Clifton MD   2,000 mg at 09/14/18 0145    phenylephrine (JOSE-SYNEPHRINE) 50 mg in dextrose 5 % 250 mL infusion    Continuous PRN Chanda Clifton MD 15 mL/hr at 09/14/18 0245 50 mcg/min at 09/14/18 0245    phenylephrine (JOSE-SYNEPHRINE) injection    PRN Lennart Le, APRN - CRNA   100 mcg at 09/14/18 0229       Allergies:  No Known Allergies    Problem List:    Patient Active Problem List   Diagnosis Code    Chronic airway obstruction (HCC) J44.9    Essential hypertension with goal blood pressure less than 130/80 I10    Hx of tobacco use, presenting hazards to health Z87.891    Hyperlipidemia E78.5    INR 1.2 09/13/2018    APTT 97.6 09/13/2018       HCG (If Applicable): No results found for: PREGTESTUR, PREGSERUM, HCG, HCGQUANT     ABGs: No results found for: PHART, PO2ART, RPT8IUH, GCH8SGH, BEART, L2IFLAHZ     Type & Screen (If Applicable):  No results found for: LABABO, [address]    Anesthesia Evaluation  Patient summary reviewed no history of anesthetic complications:   Airway: Mallampati: IV        Dental: normal exam         Pulmonary:normal exam    (+) COPD:      (-) not a current smoker          Patient did not smoke on day of surgery. Cardiovascular:    (+) hypertension:, hyperlipidemia         Beta Blocker:  Not on Beta Blocker         Neuro/Psych:   Negative Neuro/Psych ROS              GI/Hepatic/Renal: Neg GI/Hepatic/Renal ROS            Endo/Other: Negative Endo/Other ROS             Pt had no PAT visit       Abdominal:   (+) obese,         Vascular: negative vascular ROS.  + PVD, aortic or cerebral, . Anesthesia Plan      general     ASA 4 - emergent       Induction: intravenous. arterial line  MIPS: Postoperative ventilation. Anesthetic plan and risks discussed with patient and Unable to obtain due to emergent nature. Plan discussed with CRNA. Ky Foote MD   9/14/2018    Due to the emergent nature of the case, all the risks and benefits could not be discussed in detail. Verbal consent was obtained from the patient.

## 2018-09-14 NOTE — CONSULTS
CATHETERIZATION         Allergies:    No Known Allergies      Home Meds:   Prior to Admission medications    Medication Sig Start Date End Date Taking? Authorizing Provider   atorvastatin (LIPITOR) 40 MG tablet Take 40 mg by mouth daily   Yes Historical Provider, MD   celecoxib (CELEBREX) 200 MG capsule Take 1 capsule by mouth 2 times daily 7/19/18  Yes Historical Provider, MD   clopidogrel (PLAVIX) 75 MG tablet Take 1 tablet by mouth daily 8/1/18  Yes Historical Provider, MD   cilostazol (PLETAL) 100 MG tablet Take 1 tablet by mouth 2 times daily 6/19/18  Yes Historical Provider, MD   predniSONE (DELTASONE) 20 MG tablet Take 2 tabs by mouth daily x 5 days, then 1 tab daily x 3 days. 9/7/18  Yes Debbie Thompson,    cyclobenzaprine (FLEXERIL) 10 MG tablet Take 1 tablet by mouth 3 times daily as needed for Muscle spasms 9/7/18 9/17/18 Yes Veena Paul,        Family History:   Family History   Problem Relation Age of Onset    Anemia Mother          Social History:   TOBACCO:   reports that he quit smoking about 4 years ago. His smoking use included Cigarettes. He has a 80.00 pack-year smoking history. He has never used smokeless tobacco.  ETOH:   reports that he does not drink alcohol. DRUGS:  reports that he does not use drugs. OCCUPATION:   There  is not history of TB or TB exposure. There is not asbestos or silica dust exposure. The patient reports does not have coal, foundry, quarry or [employer] exposure. Travel history reveals nothing of significance. There is not  history of recreational or IV drug use. There is not hot tube exposure. The patient does not have pets, dogs, cats turtles or exotic birds. REVIEW OF SYSTEMS:    Review of Systems -   Unable to obtain due to intubated.          Physical Exam:    Vitals: /83   Pulse 95   Temp 98.4 °F (36.9 °C) (Axillary)   Resp 16   Ht 6' (1.829 m)   Wt 270 lb (122.5 kg)   SpO2 95%   BMI 36.62 kg/m²     Last Body weight:   Wt Readings -----------------------------------------------------------------  Pulmonary Functions Testing Results:    No results found for: FEV1, FVC, XFO3LEE, TLC, DLCO  Radiological reports:  CXR    CTA abd/pelv w/ b/l Runnoff in process   ----I have reviewed the images of the CT angiogram at home mobilized the team I findings demonstrate that there is a patent abdominal aorta to the level of the renals and then an occlusion unable to age this occlusion.  On the right common iliac artery there is a stent which appears to be undersized and intruding into the aorta the left common iliac artery has a stent which appears to be understood eyes in a more distal aspect of the common iliac artery such that the left is overriding the right side.  There is thrombus throughout the common and external iliac arteries. Taryn Le is reconstitution of flow in the right lower extremity from the femorals distally whereas the left lower extremity is completely thrombosed I do not see calcifications to suggest that this is due to a calcific plaque. Assessment and Plan     Active Problems:    Arterial occlusion    Ischemia of left lower extremity  Resolved Problems:    * No resolved hospital problems. *      Additional assessment:  1. Ischemic R leg - s/p left leg thrombectomy, right common iliac/aorta thrombectomy, left axillary to femoral bypass, left and right fem-fem bypass, fasciotomy. 2. Smoker  3. HLD    Plan:  Cont mechanical ventilation. Cont heparin  Patient to return to OR per vascular  Patient was a difficult intubation. Will recommend to continue mechanical ventilation till acute issues resolve  Cont sedation  Daily ABGs  CT scan of the chest was reviewed/ordered. Thank you for having us involved in the care of your patient. Please call us if you have any questions or concerns.     Joanne Connelly M.D.            9/14/2018, 9:17 AM

## 2018-09-14 NOTE — ED NOTES
Critical PTT 97.6 received from lab. Dr. Nicholas Desai informed.      Azalea Campuzano, GURJIT  09/13/18 1539

## 2018-09-14 NOTE — ED PROVIDER NOTES
[facility] ED  Emergency Department Encounter  Emergency Medicine Resident     Pt Name: Carlo Lowe  MRN: [id number]  [place] 1960  Date of evaluation: 9/13/18  PCP:  No primary care provider on file. CHIEF COMPLAINT       Chief Complaint   Patient presents with    Other     pulseless left leg       HISTORY OF PRESENT ILLNESS  (Location/Symptom, Timing/Onset, Context/Setting, Quality, Duration, Modifying Factors, Severity.)      Carlo Lowe is a 60 y.o. male who presents with Left lower extremity arterial occlusion. Patient came from [facility], with left leg pain. At that time they did not have any pulses to the left lower extremity, the case was discussed with Dr. Diana Kulkarni, who wanted patient placed on a heparin bolus, after a CTA of the abdomen was done that showed this occlusion. Patient was supposed to be started on a heparin drip, but patient arrives without any heparin running. Patient was given heparin bolus prior to arrival.  Patient reports left thigh pain, he states that he cannot feel sensation in his foot. PAST MEDICAL / SURGICAL / SOCIAL / FAMILY HISTORY      has a past medical history of Hyperlipemia and Peripheral vascular disease ([address] has a past surgical history that includes Cardiac catheterization. Social History     Social History    Marital status:      Spouse name: N/A    Number of children: N/A    Years of education: N/A     Occupational History    Not on file. Social History Main Topics    Smoking status: Former Smoker     Packs/day: 2.00     Years: 40.00     Types: Cigarettes     Quit date: 9/7/2014    Smokeless tobacco: Never Used    Alcohol use No    Drug use: No    Sexual activity: Not on file     Other Topics Concern    Not on file     Social History Narrative    No narrative on file       Family History   Problem Relation Age of Onset    Anemia Mother        Allergies:  Patient has no known allergies.     Home Type and Screen   Result Value Ref Range    Expiration Date 09/17/2018     Arm Band Number [id number]     ABO/Rh A NEGATIVE     Antibody Screen NEGATIVE     Unit Number [id number]     Product Code Leukocyte Reduced Red Cell     Unit Divison 0     Dispense Status ALLOCATED     Transfusion Status OK TO TRANSFUSE     Crossmatch Result COMPATIBLE     Unit Number [id number]     Product Code Leukocyte Reduced Red Cell     Unit Divison 0     Dispense Status ALLOCATED     Transfusion Status OK TO TRANSFUSE     Crossmatch Result COMPATIBLE        RADIOLOGY:  No results found. [facility]/EMERGENCY DEPARTMENT COURSE:  829 PM: 50-year-old male presenting with acute occlusion of the left lower extremity. On arrival, patient does not have palpable or dopplerable pulses in the left lower extremity. Patient's leg is pale and cool to the touch. Patient had a CTA prior to transfer from an outside hospital, but images were not sent, vascular surgery is aware of this patient and aware that images were not sent. Vascular surgery did request repeat imaging, and stated they will be down to evaluate the patient. He is reporting a fair amount of pain. He is also hypertensive. She did receive a heparin bolus prior to transfer. ED Course as of Sep 14 0701   Thu Sep 13, 2018   2229 I personally went to CT to ask them to  expedite his CTAs, I also spoke with Dr. Jace Young, vascular surgery resident, who states they will be down to evaluate patient.  [AF]   [phone] Patient in CT scanner, unable tolerate at this time, pain meds given.  [AF]   2259 CT scan finished, patient back in his room  [AF]   2302 Elevated PTT noted but patient received heparin prior to arrival PTT: (!!) 97.6 [AF]   2308 Patient's vital signs have not changed with analgesia. We will give a dose of labetalol, vascular surgery notified of this. [AF]   8003 BP still 206/130. More analgesis and second dose of labetalol ordered.  LLE more dusky, increasing pain,

## 2018-09-14 NOTE — PROGRESS NOTES
Taken to OR in bed with Dr. Kandy Rivera, [address] [place] [address] nurse and Dr. Nishant Mccarthy at bedside.

## 2018-09-14 NOTE — OP NOTE
groin  incision, a curvilinear incision was made in the left groin. Dissection  was carried through the skin and subcutaneous tissue. Electrocautery was  used for hemostasis and vein branches were identified along with lymphatic  branches which were controlled with silk ties. Once the femoral artery was identified, it was dissected proximally and  distally until we had the common femoral artery looped, profunda and  superficial femoral artery looped. Transverse incision was made just above the profunda femoris artery and  then thrombus was immediately identified. This was removed and sent for  specimen. Then Nadege embolectomy was placed retrograde down the leg and  then antegrade up the leg. Once all the thrombus was removed and there was some inflow from the  proximal segment of the artery, we then placed an 8-Divehi sheath down the  leg and then performed a diagnostic angiogram with the aid of a catheter,  which ultimately was placed at the level of the popliteal artery,  demonstrating a patent superficial femoral artery and some tibial vessels,  however, there appeared to be mixed disease in the tibial suggestive of  some embolized thrombus, so the catheter placed at the level of the  tibioperoneal trunk. Total of 10 mg of TPA infused over 5 minutes to help  lysis this clot. The wires and catheters and sheaths were removed, then to establish flow  rapidly into the leg. The anastomosis was closed with a running 6-0  Prolene. At this point, we tried to evaluate this aortic occlusion since it appeared  that the right iliac stent was dominant stent overriding the left side and  felt that there might be possible to perform an endovascular relining,  perhaps an Aorto-Uni to the right side and then a fem-fem bypass. So, the  incision was made in the right groin, carried through the subcutaneous  tissue. Electrocautery was used for hemostasis and dissection.   Once  everything was identified from the common femoral artery with looped  proximally, the profunda and superficial femoral artery well looped. Transverse incision was made over the profunda femoris artery and then  bleeding was immediately identified, both retrograde and antegrade. Nadege embolectomy catheter was placed down the right superficial femoral  artery without any thrombus and then up the common iliac artery. We got  minimal thrombus from the right side. Finally, a retrograde diagnostic  angiogram was performed with the sheath placed in the right common femoral  artery and demonstrated some occlusive disease  Angled Glidewire and  catheter were used, we were able to get retrograde into the iliac system,  however, when I tried to perform an embolectomy of the aorta and iliac  system, the balloon neither popped or I was unable to pass a larger Nadege  embolectomy balloon. There was an area of disease that I could not cross,  which consisted with ending of the retrograde flow, this suggested that  there was a chronic disease on the right side. Thought process; at this point, it appeared to me that there is chronic  disease on the right side. In fact, he is probably been living off  collaterals bilaterally. On the left side looking at the flow, it appears  that he must have acute thrombosis on the left side. There is a history of  some recent back pain. He must have either clotted off internals or his  aorta recently developed some worsening pathology. Regardless of this, it  was not possible to develop in-line flow from the right side or the left  side based on the information had at the time and imaging and the physical  examination and intraoperative findings I had. As a result, the decision was made to transition from endovascular repair  to axillary artery to femoral-femoral artery bypass, so we positioned  ourselves for such.     Transverse incision was made 2 cm below the left clavicle into the skin and  subcutaneous tissue. Once we encountered the pec major, we retracted  inferiorly until we found the pec minor, which was transected. Once we got  through a large amount of fat, were able to identify the axillary artery  which was looped proximally and distally. We then made a counterincision in the chest and then using a tunneler,  tunneled an 8-mm ringed PTFE Pace-Sage graft from the femoral to the  axillary artery, making sure that it was subpectoral.    Once everything was appropriate lined into length, a control was placed in  the axillary artery and then a #11 blade was used to make an arteriotomy  and a segment artery was removed and this was sewn in place in a standard  running fashion with a 5-0 series stitch. Once this was completed, hemostasis was confirmed and thrombin Gelfoam was  packed around the wound. Then simultaneously, I had the resident perform the anastomosis on the  right side and I on the left. I tunneled with my fingers tract for the  6-mm Pace-Sage graft. I positioned due to the appropriate location,  spatulated appropriately. I then sewed in the heel stitch and allowed the  resident to sew in on the right side while I sewed in the 8-mm Pace-Sage  graft on the left common femoral artery just proximal to the previous  transverse arteriotomy in a nice healthy segment. I confirmed that the  anastomosis on the resident site was complete and was in intact. There no  hemostasis requiring, no stitches, and I was satisfied with it. Then with  his side control we back bled from the right side of the left side,  ensuring that everything was de-aired and heparinized saline. I then at  that time opened up the left lower extremity which was 8-mm axillary to  femoral artery bypass. This was also hemostatic.   Once I was satisfied  with both the femoral anastomosis, we clamped the axillary bypass graft, I  removed the appropriate rings and performed a [id number] anastomosis  connecting the axillary

## 2018-09-15 PROBLEM — M62.82 RHABDOMYOLYSIS: Status: ACTIVE | Noted: 2018-01-01

## 2018-09-15 PROBLEM — I99.8 ISCHEMIA OF RIGHT LOWER EXTREMITY: Status: ACTIVE | Noted: 2018-01-01

## 2018-09-15 PROBLEM — N17.9 ACUTE RENAL FAILURE (ARF) (HCC): Status: ACTIVE | Noted: 2018-01-01

## 2018-09-15 NOTE — PROGRESS NOTES
09/15/18 0430 108/66 - - 127 26 98 % -   09/15/18 0415 88/60 - - 129 26 98 % -   09/15/18 0345 129/87 - - 142 26 98 % -   09/15/18 0330 101/82 - - 143 26 98 % -   09/15/18 0315 94/79 - - 152 26 97 % -   09/15/18 0300 (!) 126/50 - - 133 26 100 % -   09/15/18 0245 (!) 116/96 - - 129 26 99 % -   09/15/18 0230 112/83 - - 129 26 99 % -   09/15/18 0215 (!) 100/59 - - 128 24 100 % -   09/15/18 0203 119/72 - - 124 24 98 % -   09/15/18 0200 - - - 126 24 98 % -   09/15/18 0145 113/87 - - 124 24 99 % -   09/15/18 0130 123/88 - - 121 24 99 % -   09/15/18 0115 108/79 - - 125 24 99 % -   09/15/18 0100 104/74 - - 129 25 99 % -   09/15/18 0045 108/78 - - 128 22 99 % -   09/15/18 0030 107/84 - - 129 22 97 % -   09/15/18 0015 109/78 - - 132 22 98 % -   09/15/18 0000 115/80 - - 131 22 97 % -   09/14/18 2345 115/68 - - 137 22 98 % -   09/14/18 2330 (!) 123/93 - - 136 22 100 % -   09/14/18 2324 - - - 138 - 97 % -   09/14/18 2315 110/79 - - 139 22 97 % -   09/14/18 2300 127/83 - - 134 22 97 % -   09/14/18 2245 112/86 - - 145 22 97 % -   09/14/18 2232 - 99.7 °F (37.6 °C) Axillary - - - -   09/14/18 2230 128/83 - - 142 22 97 % -   09/14/18 2215 (!) 137/95 - - 138 22 96 % -   09/14/18 2200 127/89 - - 132 24 97 % -   09/14/18 2145 120/84 - - 136 24 97 % -   09/14/18 2130 (!) 143/118 99.5 °F (37.5 °C) - 149 24 96 % -   09/14/18 2115 (!) 169/115 99.4 °F (37.4 °C) - 138 24 96 % -   09/14/18 2100 (!) 166/97 99.5 °F (37.5 °C) - 140 24 95 % -   09/14/18 2045 (!) 141/79 99.6 °F (37.6 °C) - 143 26 95 % -   09/14/18 2040 - 99.4 °F (37.4 °C) - 138 28 95 % -   09/14/18 2035 - 99.3 °F (37.4 °C) Axillary 143 30 95 % -   09/14/18 2030 - 99 °F (37.2 °C) Axillary 139 30 95 % -   09/14/18 2029 121/84 98.6 °F (37 °C) - 139 (!) 33 - -   09/14/18 2025 - 99 °F (37.2 °C) - 132 - 97 % -   09/14/18 2015 94/66 99.1 °F (37.3 °C) - 122 24 96 % -   09/14/18 2000 - 99.4 °F (37.4 °C) - 129 24 97 % -   09/14/18 1945 112/64 99.4 °F (37.4 °C) - 123 24 97 % -   09/14/18 are normal. He exhibits no distension and no mass. Bilateral surgical groin wounds   Lymphadenopathy:     He has no cervical adenopathy. Neurological: He is alert. He displays normal reflexes. He exhibits normal muscle tone. Responding with hand  and spontaneous eye opening. Skin: No rash noted. He is not diaphoretic. No erythema. Nursing note and vitals reviewed. Lower Extremities :cyanosis bilateral lower ext . Loss of sensation . Weakness lower ext . Dressing in place from fasciotomy . Laboratory findings:    Recent Labs      09/13/18   2224  09/14/18   1155 09/14/18   1741 09/14/18   1932  09/15/18   0337   WBC  23.2*  35.6*   --   39.0*   --   40.5*   HGB  17.6*  15.4   < >  13.3  14.3  13.9   HCT  53.8*  45.6   < >  41.2  42.9  42.9   PLT  440  465*   --   435   --   378   INR  1.2   --    --    --    --    --     < > = values in this interval not displayed. Recent Labs      09/14/18   1155 09/14/18   1156 09/14/18   1541  09/14/18   1741  09/15/18   0337   NA  140   --    --   137  142  139   K  4.7   --    < >  3.7  5.1  5.7*   CL  102   --    --    --   107  107   CO2  21   --    --    --   20  16*   GLUCOSE  153*   --    --    --   177*  218*   BUN  20   --    --    --   22*  30*   CREATININE  0.74   --    --    --   1.03  2.33*   MG  1.8   --    --    --    --    --    CALCIUM  7.6*   --    --    --   7.7*  6.8*   CAION   --   1.07*   --   1.00*   --   1.03*   PHOS  3.9   --    --    --    --    --     < > = values in this interval not displayed.      Recent Labs      09/14/18   1155  09/14/18   1741 09/14/18   2345  09/15/18   0507   PROT  5.4*   --    --    --    LABALBU  2.8*   --    --    --    AST  52*   --    --    --    ALT  96*   --    --    --    ALKPHOS  68   --    --    --    BILITOT  0.41   --    --    --    CKTOTAL  478*  8,687*  56,000*  56,720*          Specific Gravity, UA   Date Value Ref Range Status   09/14/2018 1.068 (H) 1.005 - 1.030 Final     Protein, UA Date Value Ref Range Status   09/14/2018 1+ (A) NEG Final     RBC, UA   Date Value Ref Range Status   09/14/2018 5 TO 10 0 - 4 /HPF Final     Comment:     Reference range defined for non-centrifuged specimen. Bacteria, UA   Date Value Ref Range Status   09/14/2018 NOT REPORTED NONE Final     Nitrite, Urine   Date Value Ref Range Status   09/14/2018 NEGATIVE NEG Final     WBC, UA   Date Value Ref Range Status   09/14/2018 2 TO 5 0 - 5 /HPF Final     Leukocyte Esterase, Urine   Date Value Ref Range Status   09/14/2018 NEGATIVE NEG Final       Clinical Course : critical       Assessment and Plan  :        1. Ischemic R leg - s/p left leg thrombectomy, right common iliac/aorta thrombectomy, left axillary to femoral bypass, left and right fem-fem bypass, fasciotomy. Had redo fem - fem bypass for  occluded graft. On argatroban. Concern hypercoagubility  from HIT . Labs pending . 2. Hyperglycemia - non diabetic , check A1C. Blood sugar goal 140 - 180 . Start insulin infusion . 3. Oliguric acute tubular necrosis - on bicarb drip . May need CVVH for rhabdo . Continue Caputo . 4. Anion gap met acidosis from ischemic injury   5. Hypotensive shock - on pressors . Art line in place. SBP goal > 100   6. Rhabdomyolysis    7. leucocytosis - secondary to ischemic leg and compartment syndrome   . maintain blood pressure  , monitor off antibiotics   8. Known PVR - s/p left iliac  stent 11/14/17 -   9. Acute resp failure - vent management per Pulm   10. COPD mixed hyperlipidemia - statin  11. Tobacco abuse - nicotine patch       Continue to monitor vitals , Intake / output ,  Cell count , HGB , Kidney function, oxygenation  as indicated . No family at bed side .    Plan discussed with Staff Deanna CAGLE     (Please note that portions of this note were completed with a voice recognition program. Efforts were made to edit the dictations but occasionally words are mis-transcribed.)      Tiburcio Berrios MD  9/15/2018

## 2018-09-15 NOTE — PROGRESS NOTES
SBP 55's, Levophed drip at 10 mcg/min, 1 L NS bolus infusing and first unit of PRBC's checked with on coming RN.   Rapid Response called

## 2018-09-15 NOTE — PROGRESS NOTES
RN contacted Dr. Erik Roach regarding pt status. RN stated that total fluids are at 150ml, but SBPs are in the 80s with MAPs >70.  Orders to keep MAPs >65

## 2018-09-15 NOTE — PROGRESS NOTES
Dr. Elias Poole at the bedside, updated on pt status. Right radial art line placed by Dr. Elias Poole. Updated on lab results, orders to give 1g calcium chloride IVPB. Orders to keep total fluids at 500ml/hr.

## 2018-09-15 NOTE — PROGRESS NOTES
sodium chloride 100 mL/hr at 09/14/18 1908    vasopressin infusion 2 Units/hr (09/15/18 0820)    fentaNYL 175 mcg/hr (09/15/18 0249)     PRN Meds:     dextrose 25 g PRN   sodium chloride flush 10 mL PRN   ondansetron 4 mg Q6H PRN   glucose 15 g PRN   dextrose 12.5 g PRN   glucagon (rDNA) 1 mg PRN   dextrose 100 mL/hr PRN   fentanNYL 50 mcg Q1H PRN   Or     fentanNYL 100 mcg Q1H PRN   glucose 15 g PRN   dextrose 12.5 g PRN   glucagon (rDNA) 1 mg PRN   dextrose 100 mL/hr PRN       SUPPORT DEVICES: [x] Ventilator [] BIPAP  [] Nasal Cannula [] Room Air    VENT SETTINGS (Comprehensive) (if applicable):  Vent Information  Ventilator Started: Yes  Vent Type: Servo i  Vent Mode: PRVC  Vt Ordered: 620 mL  Rate Set: 22 bmp  FiO2 : 40 %  Sensitivity: 5  PEEP/CPAP: 5  I Time/ I Time %: 0.7 s  Cuff Pressure (cm H2O): 22 cm H2O  Humidification Source: HME  Additional Respiratory  Assessments  Pulse: 105  Resp: 18  SpO2: 100 %  End Tidal CO2: 30 (%)  Position: Semi-Matson's  Humidification Source: HME  Oral Care Completed?: Yes  Oral Care: Mouth moisturizer, Mouth swabbed, Lip moisturizer applied, Mouthwash  Subglottic Suction Done?: Yes  Cuff Pressure (cm H2O): 22 cm H2O    ABGs:   Lab Results   Component Value Date    PHART 7.307 09/14/2018    LQB7QVO 42.1 09/14/2018    PO2ART 105.0 09/14/2018    LBT2FNI 20.5 09/14/2018    GSZ4DTU 16 09/15/2018    P2OKFOLE 97.6 09/14/2018    FIO2 50.0 09/15/2018         DATA:  Complete Blood Count: Recent Labs      09/14/18   1155   09/14/18   1741  09/14/18   1932  09/15/18   0337   WBC  35.6*   --   39.0*   --   40.5*   RBC  5.17   --   4.53   --   4.70   HGB  15.4   < >  13.3  14.3  13.9   HCT  45.6   < >  41.2  42.9  42.9   MCV  88.2   --   90.9   --   91.3   MCH  29.8   --   29.4   --   29.6   MCHC  33.8   --   32.3   --   32.4   RDW  13.9   --   14.1   --   14.0   PLT  465*   --   435   --   378   MPV  11.3   --   11.3   --   11.6    < > = values in this interval not displayed. Last 3 Blood Glucose:   Recent Labs      09/13/18   2224  09/14/18   1155  09/14/18   1741  09/15/18   0337   GLUCOSE  180*  153*  177*  218*        PT/INR:    Lab Results   Component Value Date    PROTIME 12.7 09/13/2018    INR 1.2 09/13/2018     PTT:    Lab Results   Component Value Date    APTT 79.4 09/15/2018       Comprehensive Metabolic Profile:   Recent Labs      09/14/18   1155   09/14/18   1541  09/14/18   1741  09/15/18   0337   NA  140   --   137  142  139   K  4.7   < >  3.7  5.1  5.7*   CL  102   --    --   107  107   CO2  21   --    --   20  16*   BUN  20   --    --   22*  30*   CREATININE  0.74   --    --   1.03  2.33*   GLUCOSE  153*   --    --   177*  218*   CALCIUM  7.6*   --    --   7.7*  6.8*   PROT  5.4*   --    --    --    --    LABALBU  2.8*   --    --    --    --    BILITOT  0.41   --    --    --    --    ALKPHOS  68   --    --    --    --    AST  52*   --    --    --    --    ALT  96*   --    --    --    --     < > = values in this interval not displayed.       Magnesium:   Lab Results   Component Value Date    MG 1.8 09/14/2018     Phosphorus:   Lab Results   Component Value Date    PHOS 3.9 09/14/2018     Ionized Calcium:   Lab Results   Component Value Date    CAION 1.03 09/15/2018    CAION 1.00 09/14/2018    CAION 1.07 09/14/2018        Urinalysis: Lab Results   Component Value Date    NITRU NEGATIVE 09/14/2018    COLORU YELLOW 09/14/2018    PHUR 5.5 09/14/2018    WBCUA 2 TO 5 09/14/2018    RBCUA 5 TO 10 09/14/2018    MUCUS NOT REPORTED 09/14/2018    TRICHOMONAS NOT REPORTED 09/14/2018    YEAST NOT REPORTED 09/14/2018    BACTERIA NOT REPORTED 09/14/2018    SPECGRAV 1.068 09/14/2018    LEUKOCYTESUR NEGATIVE 09/14/2018    UROBILINOGEN Normal 09/14/2018    BILIRUBINUR NEGATIVE 09/14/2018    GLUCOSEU NEGATIVE 09/14/2018    KETUA NEGATIVE 09/14/2018    AMORPHOUS NOT REPORTED 09/14/2018       HgBA1c:  No results found for: LABA1C  TSH:  No results found for: TSH  Lactic Acid: No results found for: LACTA   Troponin: No results for input(s): TROPONINI in the last 72 hours. Microbiology:  Urine Culture:  No components found for: CURINE  Blood Culture:  No components found for: CBLOOD, CFUNGUSBL  Sputum Culture:  No components found for: CSPUTUM    Radiology/Imaging:  Impression   Left internal jugular central venous catheter placement with the tip at the   level of the distal SVC. ASSESSMENT:     Patient Active Problem List    Diagnosis Date Noted    Ischemia of right lower extremity 09/15/2018    Acute renal failure (ARF) ([address] 09/15/2018    Rhabdomyolysis 09/15/2018    [facility]N [facility] necrosis[facility]     Oliguria     Arterial occlusion 09/14/2018    Ischemia of left lower extremity 09/14/2018    Hx of tobacco use, presenting hazards to health 11/29/2017    Essential hypertension with goal blood pressure less than 130/80 06/08/2016    Peripheral vascular disease ([facility] 11/20/2013    Chronic airway obstruction ([facility] 01/12/2012    Hyperlipidemia 12/15/2011          PLAN:     WEAN PER PROTOCOL:  [x] No   [] Yes  [] N/A    ICU PROPHYLAXIS:  Stress ulcer:  [] PPI Agent  [x] [id number] [] Sucralfate  [] Other:  VTE:   [] Enoxaparin  [] Unfract. Heparin Subcut  [] EPC Cuffs    NUTRITION:  [x] NPO [] Tube Feeding (Specify: ) [] TPN  [] PO    HOME MEDS RECONCILED: [x] No  [] Yes    CONSULTATION NEEDED:  [] No  [x] Yes    FAMILY UPDATED:    [x] No  [] Yes    TRANSFER OUT OF ICU:   [x] No  [] Yes    Additional Assessment:  Principal Problem:    Arterial occlusion  Active Problems:    Essential hypertension with goal blood pressure less than 130/80    Ischemia of left lower extremity    Ischemia of right lower extremity    Acute renal failure (ARF) (H[facility])    Rhabdomyolysis    ATN (acute tubular necrosis) ([facility]    Oliguria  Resolved Problems:    * No resolved hospital problems. *  1.  Ischemic R leg - s/p left leg thrombectomy, right common iliac/aorta thrombectomy, left axillary to femoral bypass, left and right fem-fem bypass, fasciotomy. 2. Smoker  3. HLD  4. Rhabdomyolysis  5. Acute renal failure due to ATN    Plan:  Cont mechanical ventilation for now. Cont argatroban  HIT antibody sent  Patient was a difficult intubation. Will recommend to continue mechanical ventilation till acute issues resolve  Cont fentanyl drip with versed pushes prn for sedation. If needed to control sedation can switch to versed drip. Daily ABGs  Nephrology consulted. Plan for CVVHD  On pressors. Cont and wean as tolerated  Cont to monitor CK levels daily. On bicarb drip. Mary Haley, PGY-3  Pulmonary & critical care. [place], [place], [place]  9/15/2018 11:03 AM  Attending Physician Statement  I have discussed the care of Minerva Kauffman, including pertinent history and exam findings,  with the resident. I have seen and examined the patient and the key elements of all parts of the encounter have been performed by me. I agree with the assessment, plan and orders as documented by the resident with additions . Continue vent support   cvvhd   donot do sbt        Total critical care time caring for this patient with life threatening, unstable organ failure, including direct patient contact, management of life support systems, review of data including imaging and labs, discussions with other team members and physicians at least 27   Min so far today, excluding procedures. Treatment plan Discussed with nursing staff in detail , all questions answered . Electronically signed by Claudetta Hilts, MD on   9/15/18 at 5:04 PM    Please note that this chart was generated using voice recognition Dragon dictation software. Although every effort was made to ensure the accuracy of this automated transcription, some errors in transcription may have occurred.

## 2018-09-15 NOTE — FLOWSHEET NOTE
Family called  @ 10 p.m. And ask  to come over to [address] family had been wating for a few hours for doctor to talked to them and doctor did not.  talked to nurse and explained the situation. Nurse asked  to bring family back for her to talk to d to allow family to see Kristi Alicia. After visit  encourage the family to go home and get a good nights's rest as patient aslo needed to do. Family all hugged the Chaaplian and express their thans as well as how much better they felt now. 09/14/18 7702   Encounter Summary   Services provided to: Patient and family together;Family   Referral/Consult From: Family   Support System Spouse; Children;Family members   Continue Visiting (9/12/18)   Complexity of Encounter High   Length of Encounter 30 minutes   Routine   Type Follow up   Assessment Anxious; Coping   Intervention Active listening;Explored feelings, thoughts, concerns;Nurtured hope;Prayer  (meeting with nurse)   Outcome Expressed gratitude;Expressed feelings of nicole, peace, and/or awe

## 2018-09-15 NOTE — OP NOTE
difficulty obtaining signals. Decision was made to take him back to the  operating room for bilateral lower extremity ischemia and concern for graft  thrombosis. DESCRIPTION OF PROCEDURE:  The patient was brought to the operating room  emergently. After undergoing appropriate anesthesia, the chest, abdomen,  bilateral groin sites, and bilateral lower extremities were  circumferentially prepped and draped in standard sterile fashion. The  patient was re-dosed with antibiotics. I began by opening up the left  groin incision, removing the sutures, and getting down to the graft in the  common femoral artery. The connection from the fem-fem bypass and the   axillofemoral bypass was opened up. There was fresh clot seen inside here. Over-the-wire thrombectomy catheter  was then taken up towards the axillary, and clot was removed sequentially  with return of pulsatile inflow. At this point, using a wire and catheter, I navigated down the left femoral  graft into the common femoral artery and SFA. I directed the wire and  catheter into the SFA. I performed hand-injection angiogram to confirm  cannulation of the superficial femoral artery, and then with an  over-the-wire and Nadege catheter, removed clot from the SFA. I also  removed clot from the profunda artery with good backbleeding noted. At this point, the artery was then clamped. We then sent the Nadege  catheter down the fem-fem bypass graft and removed a significant amount of  clot. There was return of backbleeding noted through the fem-fem  bypass graft. At this point, the anastomosis was redone in an end-to-side fashion using a  [place]-Sage suture. Prior to completion, flushing maneuvers were performed,  and everything was flushed with heparinized saline. The micropuncture  needle was then used to access the axillary limb in antegrade fashion and  then place a micropuncture sheath in.   I performed an angiogram through the  graft of bilateral lower extremities and the fem-fem bypass. This revealed  flow down the left profunda and some clot formation near the proximal SFA, but it  did reach down to the popliteal artery. The fem-fem graft was open;  however, the SFA was occluded, and one profunda branch was opened on the  right side. So, at this point, I re-explored the left groin, dissected out the  superficial femoral and profunda arteries. There was a previous  arteriotomy there, which was opened up, and fresh thrombus was removed. There was some platelet debris that could also be noted around here as well, and  this was also completely removed. There was good backbleeding noted. The  graft was flushed with saline, and the arteriotomy was closed with running  5-0 Prolene suture. I then opened up the right groin incision to get down  to the graft in the femoral bifurcation. The vessels were controlled. The  graft was controlled by placing a clamp on the fem-fem bypass graft, and  the anastomosis was opened up. There was white platelet debris along the  anastomotic connection. This was all removed. I sent the Nadege catheter  down the SFA and profunda and removed significant amount of clot. There  was good backbleeding noted. At this point, I re-did the right femoral anastomosis in an end-to-side  fashion using 2 [place]-Sage sutures. Prior to completion, usual flushing  maneuver was performed. There was great inflow coming from the fem-fem  bypass graft. Once this was done, I did a completion angiogram which  showed a patent axillo-fem-fem bypass graft; however, there was sluggish  flow going down the SFA on the left and the SFA on the right. I  re-explored through the arteriotomy and saw again platelet debris forming. At this point, the patient was already on a heparin drip and had been  bolused during the beginning of the procedure. The heparin was taken off  the field, and the patient was ordered to receive argatroban.   All the  heparinized saline from the field was removed. The platelet debris and  clot were removed, and there was return of good backbleeding. Arteriotomy  was closed on the left side with a 5-0 Prolene suture. I then opened up  the connection from the axillary limb to the right femoral bypass graft and  you could see, where the 6-mm graft joined 8-mm graft, there was a lot of  platelet debris here as well, so the anastomosis was taken out. I actually  opened up an 8-mm ringed PTFE graft and re-tunneled it, and after washing  out the platelet debris from the graft, I performed a left to right Fem-Fem bypass  In an end-to-side anastomosis using a [place]-Sage suture. I removed the 6-mm   ringed PTFE graft off the right femoral bifurcation. I extended the arteriotomy up into the common femoral artery. I had good backbleeding after removal of platelet debris  and thrombus from the anastomotic connection. I performed end-to-side  anastomosis using [place]-Sage suture. I did not clamp the native vessels out  of concern for more platelet debris forming. Prior to completion, the  grafts were opened up and allowed to flush out. There was good antegrade  bleeding coming from the iliac vessels on the right side but, however, it  was not pulsatile. After completion of the anastomosis, I repeated the  angiogram on the left side which showed a patent axillo-femoral bypass  graft with flow down the profunda and SFA down to the popliteal.  It was  hard to see the tibial vessels due to the C-arm and contrast dose given. I  then evaluated the left-to-right fem-fem bypass graft, it was patent. There was sluggish flow down the SFA. Profunda was widely open. I then  clamped the graft again. I performed a graftotomy just above  the anastomosis and could see some more platelet debris here. This was  evacuated using a thrombectomy catheter. There was good bleeding noted,  and then the graftotomy was closed.   Completion angiogram showed great flow  now down through the profunda and SFA, through the left-to-right fem-fem  bypass graft. I could see all the way down to the tibial takeoffs;  however, it was difficult to see flow down past the midcalf due to the  contrast amount and the imaging that we had in the operating room. At this point, it did look like the right leg was having a significant amount of  swelling in the calf due to likely reperfusion, so 4-compartment fasciotomy  was performed by making a lateral and medial incision down the calf and  then getting down to the fascia and opening up the fascial compartments of  the superior and deep compartments on both sides. The muscle did have some  reactivity and did bulge out. The left leg, unfortunately the  muscle compartment, still seemed to be a very low activity and did not  swell up as much as you would expect for a significant amount of ischemia. There was significant amount of venous oozing noted at the fasciotomy  sites. This was controlled with electrocautery and manual pressure. At this point, given that the grafts were open with relatively ok outflow, I decided to conclude the procedure and keep the  patient on argatroban drip and get him to the ICU for further resuscitation  and warming him up. At this point, both groin sites were irrigated and  dried. Puma's layers were reapproximated with interrupted Vicryl. Skin  was reapproximated with horizontal mattress nylon sutures. Fasciotomy  sites were irrigated and dried. They were wrapped with gauze, ABD, Kerlix,  and a light Ace wrap. At completion, it was difficult to find any signals  in the foot or the popliteal arteries. The toes and foot did appear to be  pale and cool; however, there was reasonable capillary refill. Mepilex  dressing was applied over the groin sites in the previous axillary exposure  site.     PLAN:  We will switch over to argatroban drip for anticoagulation due to  suspicion of HIT

## 2018-09-15 NOTE — CONSULTS
Social History    Marital status:      Spouse name: N/A    Number of children: N/A    Years of education: N/A     Occupational History    Not on file. Social History Main Topics    Smoking status: Former Smoker     Packs/day: 2.00     Years: 40.00     Types: Cigarettes     Quit date: 9/7/2014    Smokeless tobacco: Never Used    Alcohol use No    Drug use: No    Sexual activity: Not on file     Other Topics Concern    Not on file     Social History Narrative    No narrative on file       Family History:        Family History   Problem Relation Age of Onset    Anemia Mother        Outpatient Medications:     Prescriptions Prior to Admission: atorvastatin (LIPITOR) 40 MG tablet, Take 40 mg by mouth daily  celecoxib (CELEBREX) 200 MG capsule, Take 1 capsule by mouth 2 times daily  clopidogrel (PLAVIX) 75 MG tablet, Take 1 tablet by mouth daily  cilostazol (PLETAL) 100 MG tablet, Take 1 tablet by mouth 2 times daily  predniSONE (DELTASONE) 20 MG tablet, Take 2 tabs by mouth daily x 5 days, then 1 tab daily x 3 days.   cyclobenzaprine (FLEXERIL) 10 MG tablet, Take 1 tablet by mouth 3 times daily as needed for Muscle spasms    Current Medications:     Scheduled Meds:    rocuronium        sodium chloride flush  10 mL Intravenous 2 times per day    famotidine (PEPCID) injection  20 mg Intravenous BID    sodium chloride  250 mL Intravenous Once    ceFAZolin  1 g Intravenous Q8H     Continuous Infusions:    sodium bicarbonate infusion 150 mL/hr at 09/15/18 0604    insulin (HUMAN R) non-weight based infusion      dextrose      argatroban infusion 1.742 mcg/kg/min (09/15/18 0545)    dextrose      norepinephrine 9 mcg/min (09/15/18 1042)    sodium chloride 100 mL/hr at 09/14/18 1908    vasopressin infusion 2 Units/hr (09/15/18 0820)    fentaNYL 175 mcg/hr (09/15/18 0249)     PRN Meds:  dextrose, sodium chloride flush, ondansetron, glucose, dextrose, glucagon (rDNA), dextrose, fentanNYL **OR** fentanNYL, glucose, dextrose, glucagon (rDNA), dextrose    Review of Systems:     Unable to assess due to intubation and sedation    Input/Output:       I/O last 3 completed shifts: In: 6888.7 [I.V.:6485.4; Blood:403.3]  Out: 7614 [Urine:1590; Emesis/NG output:700; Blood:600]    Vital Signs:   Temperature:  Temp: 97.2 °F (36.2 °C)  TMax:   Temp (24hrs), Av.1 °F (37.3 °C), Min:92.5 °F (33.6 °C), Max:99.7 °F (37.6 °C)    Respirations:  Resp: 15  Pulse:   Pulse: 118  BP:    BP: 93/64  BP Range: Systolic (23XRN), YY , Min:56 , RKD:414       Diastolic (44SGO), AER:40, Min:46, Max:118      Physical Examination:     General:  Sedated and intubated, awakens easily, warming blanket in place  Neck:   No JVD, no thyromegaly, no lymphadenopathy. Chest:   Bilateral vesicular breath sounds, no rales or wheezes. Cardiac:  S1 S2 RR, no murmurs, gallops or rubs, JVP not raised. Abdomen: Obese, Soft, non-tender, non distended, BS audible. :   No suprapubic or flank tenderness. SKIN:  No rashes, good skin turgor. Extremities:  Cyanotic lower extremities with significant evidence of decreased perfusion bilateral distal lower extremities. Labs:       Recent Labs      18   1155   18   1741  18   1932  09/15/18   0337   WBC  35.6*   --   39.0*   --   40.5*   RBC  5.17   --   4.53   --   4.70   HGB  15.4   < >  13.3  14.3  13.9   HCT  45.6   < >  41.2  42.9  42.9   MCV  88.2   --   90.9   --   91.3   MCH  29.8   --   29.4   --   29.6   MCHC  33.8   --   32.3   --   32.4   RDW  13.9   --   14.1   --   14.0   PLT  465*   --   435   --   378   MPV  11.3   --   11.3   --   11.6    < > = values in this interval not displayed.       BMP:   Recent Labs      18   1155   18   1541  18   1741  09/15/18   0337   NA  140   --   137  142  139   K  4.7   < >  3.7  5.1  5.7*   CL  102   --    --   107  107   CO2  21   --    --   20  16*   BUN  20   --    --   22*  30*   CREATININE  0.74 --    --   1.03  2.33*   GLUCOSE  153*   --    --   177*  218*   CALCIUM  7.6*   --    --   7.7*  6.8*    < > = values in this interval not displayed. Phosphorus:     Recent Labs      09/14/18   1155   PHOS  3.9     Magnesium:    Recent Labs      09/14/18   1155   MG  1.8     Albumin:    Recent Labs      09/14/18   1155   LABALBU  2.8*     BNP:    No results found for: BNP  SHANNON:    No results found for: SHANNON  SPEP:  Lab Results   Component Value Date    PROT 5.4 09/14/2018       Urinalysis/Chemistries:      Lab Results   Component Value Date    NITRU NEGATIVE 09/14/2018    COLORU YELLOW 09/14/2018    PHUR 5.5 09/14/2018    WBCUA 2 TO 5 09/14/2018    RBCUA 5 TO 10 09/14/2018    MUCUS NOT REPORTED 09/14/2018    TRICHOMONAS NOT REPORTED 09/14/2018    YEAST NOT REPORTED 09/14/2018    BACTERIA NOT REPORTED 09/14/2018    SPECGRAV 1.068 09/14/2018    LEUKOCYTESUR NEGATIVE 09/14/2018    UROBILINOGEN Normal 09/14/2018    BILIRUBINUR NEGATIVE 09/14/2018    GLUCOSEU NEGATIVE 09/14/2018    KETUA NEGATIVE 09/14/2018    AMORPHOUS NOT REPORTED 09/14/2018       Radiology:     CXR:   FINDINGS:   Right IJ catheter tip projects over the cavoatrial junction.  Endotracheal   tube tip terminates 2.3 cm above the domi.  Enteric tube coils within the   stomach.  Low lung volumes.  No pneumothorax or significant pleural effusion. Minimal hazy opacities at the left lung base.           Impression   Interval advancement of endotracheal tube now terminates 2.3 cm above the   domi.  Remainder of support tubes and lines are stable.       Low lung volumes with left lung base infiltrate and probable small effusion       Assessment:     1. Acute Kidney Injury: Acute Tubular Necrosis secondary to multiple contrast exposures including CT scans and angiograms, in addition to hypotension. 2. Rhabdomyolysis  3. Ischemic right lower extremity  4. Metabolic Acidosis  5. Respiratory Failure  6. Hypotensive shock  7. Severe PVD    Plan:   1.

## 2018-09-15 NOTE — PROGRESS NOTES
RN contacted Dr. Robert Man regarding pt status. RN stated that propofol is now off and fentanyl is infusing at 175mcg/hr. RN updated him on HR in the 130s, SBPs in the low 100s, and currently gtts infusing. Orders to make total fluids at 150ml/hr once blood is infused. Will continue to monitor.

## 2018-09-15 NOTE — PROGRESS NOTES
RN contacted Dr. Yanelis Fortune regarding inability to place an arterial line with anesthesia. Orders received to draw off the central line.

## 2018-09-15 NOTE — PROGRESS NOTES
Nutrition Assessment    Type and Reason for Visit: Initial    Nutrition Recommendations: If nutrition support becomes necessary, recommend Nepro tube feed goal of 45 ml per hour to provide 1944 kcal, 87 g protein    Malnutrition Assessment:  · Malnutrition Status: At risk for malnutrition  · Context: Acute illness or injury  · Findings of the 6 clinical characteristics of malnutrition (Minimum of 2 out of 6 clinical characteristics is required to make the diagnosis of moderate or severe Protein Calorie Malnutrition based on AND/ASPEN Guidelines):  1. Energy Intake-Not available, not able to assess    2. Weight Loss-No significant weight loss,    3. Fat Loss-No significant subcutaneous fat loss,    4. Muscle Loss-No significant muscle mass loss,    5. Fluid Accumulation-Moderate to severe fluid accumulation, Extremities  6.  Strength-Not measured    Nutrition Diagnosis:   · Problem: Inadequate oral intake  · Etiology: related to Impaired respiratory function-inability to consume food     Signs and symptoms:  as evidenced by Intubation    Nutrition Assessment:  · Subjective Assessment: Pt seen due to vent. Plan for CVVHD noted. Faciotomy also noted.   · Nutrition-Focused Physical Findings: Obese, vented, abd soft, hypo BS  · Wound Type: Surgical Wound  · Current Nutrition Therapies:  · Oral Diet Orders: NPO   · Anthropometric Measures:  · Ht: 6' (182.9 cm)   · Current Body Wt: 285 lb (129.3 kg)  · Usual Body Wt: 269 lb (122 kg)  · Ideal Body Wt: 178 lb (80.7 kg), % Ideal Body 160%  · BMI Classification: BMI 35.0 - 39.9 Obese Class II  · Comparative Standards (Estimated Nutrition Needs):  · Estimated Daily Total Kcal: 15-18 kcal/kg= 0404-8259 kcal  · Estimated Daily Protein (g): 1.8-2 g/qj=283-836 g    Estimated Intake vs Estimated Needs: Intake Less Than Needs    Nutrition Risk Level: High    Nutrition Interventions:   Start Tube Feeding  Education not appropriate at this time    Nutrition Evaluation: · Evaluation: Goals set   · Goals: initiation of nutrition support within 48 hours    · Monitoring: NPO Status    See Adult Nutrition Doc Flowsheet for more detail.      Electronically signed by Don Buitrago RD, ALDO on 9/15/18 at 2:39 PM    Contact Number: [phone]

## 2018-09-15 NOTE — PROGRESS NOTES
Dr. Brandie Lancaster at bedside to see patient . Updated on patient's condition. Vasopressors infusing at this time, IVF's and Fentanyl drip. Recommends changing Fentanyl drip to Versed drip to keep patient sedated.  Will update Vascular regarding Pulmonary recommendations

## 2018-09-15 NOTE — PROGRESS NOTES
Dr. Callum Lopez at the bedside. Popliteal pulses found in both legs. Fentanyl gtt started, propofol off. Upon reviewing CXR, Dr. Callum Lopez stated central line could be used. He asked that urine output goal be 75-100ml. MAPs to be >65, SBPs >90.

## 2018-09-15 NOTE — FLOWSHEET NOTE
[address] [facility]     Emergency/Trauma Note    PATIENT NAME: Tony Roper    Shift date: September 14,1018  Shift day: Friday   Shift # 2    Room # 2726/7739-38   Name: Tony Roper            Age: 60 y.o. Gender: male          Restorationist: No Islam on file   Place of Shinto:     Trauma/Incident type: Rapid response  Admit Date & Time: 9/13/2018 10:12 PM  TRAUMA NAME:        PATIENT/EVENT DESCRIPTION:  Tony Roper is a 60 y.o. male whois a patient on Car 1 room 1028 as a RRT. Patient . Pt to be admitted to [facility]/1028-01. SPIRITUAL ASSESSMENT/INTERVENTION:   talked to talimy members and put them all in the family waiting room, then when the nurse supervisor was free took her back to  Talk to the family. PATIENT BELONGINGS:  With patient    ANY BELONGINGS OF SIGNIFICANT VALUE NOTED:  none        WHAT IS YOUR SPIRITUAL CARE PLAN FOR THIS PATIENT?:  Chaplains are ready ot provide spiritual and emotional suport         09/14/18 1920   Encounter Summary   Services provided to: Family   Referral/Consult From: Multi-disciplinary team   Support System Spouse; Children;Family members   Continue Visiting (9/14/18)   Complexity of Encounter High   Length of Encounter 45 minutes   Crisis   Type Rapid response   Assessment Anxious; Fearful  (tearful)   Intervention Active listening;Explored feelings, thoughts, concerns  (stacie nurse supervisor to talk to family in waiting room)   Outcome Expressed gratitude       Electronically signed by Soto Lamb, on 9/14/2018 at 8:48 PM.  [facility]  [phone]

## 2018-09-15 NOTE — PROGRESS NOTES
Page sent to Dr. Yahir Purcell  Call back received from Dr. Yahir Purcell regarding starting CVVH  Have not heard back from Dialysis nurse yet at this time  Dr. Yahir Purcell will get in touch with nurse & round on pt as soon as he can

## 2018-09-15 NOTE — PROGRESS NOTES
RN contacted Dr. Sherree Shone regarding difference in cuff pressure and art line pressure of 40 points. Orders to follow cuff pressures and place a new art line if possible.

## 2018-09-15 NOTE — PROGRESS NOTES
Dr. Stephanie Mitchell called back to unit, notified of consult, reason for admission and reviewed newest labs.

## 2018-09-15 NOTE — CARE COORDINATION
Not appropriate for initial interview at this time. Undergoing procedure in room.   Will check back as time allows

## 2018-09-15 NOTE — PROGRESS NOTES
RN contacted Dr. Kareem Cisse regarding pt slowly becoming more agitated. RN stated that temp is 99.7 axillary and HR is in the high 140s. RN stated pt is on 23mcg of levophed and pts SBPs are in the 90s-100s. Orders received for a one time dose of percocet 1 tablet. Will continue to monitor.

## 2018-09-16 PROBLEM — E87.20 METABOLIC ACIDOSIS: Status: ACTIVE | Noted: 2018-01-01

## 2018-09-16 PROBLEM — E87.5 HYPERKALEMIA: Status: ACTIVE | Noted: 2018-01-01

## 2018-09-16 PROBLEM — K72.00 SHOCK LIVER: Status: ACTIVE | Noted: 2018-01-01

## 2018-09-16 NOTE — PROGRESS NOTES
thrombectomy, left axillary to femoral bypass, left and right fem-fem bypass, fasciotomy. He has underlying history of hypertension, hyperlipidemia, PVD, current smoker. No family at bedside for additional history. Consulted for medical management of elevated blood sugars, elevated blood pressure. . Patient had occlusion of grafts  On POD # 1 and was taken to OR for thrombectomy and redo fem - fem bypass. Patient also had hypotension and shock requiring pressor support along with ELVER with anion gap metabolic acidosis and lactic acidosis and rhabdomyolysis from ischemic injury . He also developed  oliguric renal failure . Patient was started on CVVH and bicarb infusion for met acidosis and hyperkalemia refractory to medical management.      PMH:  Past Medical History:   Diagnosis Date    COPD [facility]chronic obstructive pulmonary disease) [facility])     Hyperlipemia     Peripheral vascular disease ([facility])     Peripheral vascular disease s/p right iliac stent (11/14/17)      Allergies: No Known Allergies   Medications :    ipratropium 2 puff Inhalation 4x daily   albuterol sulfate HFA 6 puff Inhalation Q4H   sodium bicarbonate      sodium bicarbonate 50 mEq Intravenous Once   fentanNYL 25 mcg Intravenous Once   sodium chloride flush 10 mL Intravenous 2 times per day   famotidine (PEPCID) injection 20 mg Intravenous BID   sodium chloride 250 mL Intravenous Once   ceFAZolin 1 g Intravenous Q8H       Review of Systems   Review of Systems   Unable to perform ROS: Intubated   Genitourinary:        Decreased urine      Objective :      Current Vitals : Temp: 97.8 °F (36.6 °C),  Pulse: 108, Resp: 18, BP: 84/67, SpO2: 93 %  Last 24 Hrs Vitals   Patient Vitals for the past 24 hrs:   BP Temp Temp src Pulse Resp SpO2   09/16/18 0730 84/67 - - 108 - -   09/16/18 0715 (!) 81/55 - - 106 - -   09/16/18 0700 87/69 - - 107 - -   09/16/18 0645 - - - 107 - -   09/16/18 0630 (!) 82/35 - - 108 - -   09/16/18 0615 82/68 - - 105 - -   09/16/18 09/15/18 0900 95/65 - - 102 20 98 %   09/15/18 0832 - - - 107 - 99 %     Intake / output   09/15 0701 - 09/16 0700  In: 3839 [I.V.:6014]  Out: 2057 [Urine:110]  Physical Exam:  Physical Exam   Constitutional: Vital signs are normal. He is sedated and intubated. HENT:   Head: Normocephalic and atraumatic. Eyes: Pupils are equal, round, and reactive to light. No scleral icterus. Neck: No JVD present. No thyromegaly present. Cardiovascular: Normal rate, regular rhythm and normal heart sounds. No murmur heard. Pulmonary/Chest: Breath sounds normal. He is intubated. He has no wheezes. He has no rales. Abdominal: Soft. Bowel sounds are normal. He exhibits no distension and no mass. Bilateral surgical groin wounds   Lymphadenopathy:     He has no cervical adenopathy. Neurological: He is alert. He displays normal reflexes. He exhibits normal muscle tone. Skin: No rash noted. He is not diaphoretic. No erythema. Nursing note and vitals reviewed. Lower Extremities :cyanosis bilateral lower ext . Loss of sensation . Weakness lower ext . Dressing in place from fasciotomy . Laboratory findings:    Recent Labs      09/13/18   2224   09/15/18   0337  09/15/18   2235  09/16/18   0415   WBC  23.2*   < >  40.5*  40.6*  40.0*   HGB  17.6*   < >  13.9  11.1*  10.3*   HCT  53.8*   < >  42.9  34.3*  31.9*   PLT  440   < >  378  313  286   INR  1.2   --    --   13.3*   --     < > = values in this interval not displayed.      Recent Labs      09/14/18   1155   09/15/18   1244   09/15/18   2235  09/15/18   2240  09/16/18   0012  09/16/18   0401  09/16/18   0415   NA  140   < >  138   --   137   --   136   --   134*   K  4.7   < >  5.1   --   5.7*   --   5.4*   --   6.9*   CL  102   < >  107   --   100   --   99   --   97*   CO2  21   < >  18*   --   15*   --   16*   --   16*   GLUCOSE  153*   < >  208*   --   186*   --   152*   --   160*   BUN  20   < >  38*   --   40*   --   40*   --   38*   CREATININE  0.74   < . continue insulin infusion . 3. Oliguric acute tubular necrosis - on bicarb drip . On  CVVH for rhabdo . Continue Caputo . 4. Anion gap met acidosis from ischemic injury   5. Hypotensive shock - on 2 pressors . Art line in place. SBP goal > 100   6. Rhabdomyolysis    7. leucocytosis - secondary to ischemic leg and compartment syndrome   . maintain blood pressure  , empiric zosyn . 8. Known PVR - s/p left iliac  stent 11/14/17 -   9. Acute resp failure - vent management per Pulm   10. COPD mixed hyperlipidemia - statin  11. Tobacco abuse - nicotine patch     Poor prognosis. Discussed with Dr April Cage to monitor vitals , Intake / output ,  Cell count , HGB , Kidney function, oxygenation  as indicated . No family at bed side .    Plan discussed with Staff Ken Blank     (Please note that portions of this note were completed with a voice recognition program. Efforts were made to edit the dictations but occasionally words are mis-transcribed.)      Kelly Saucedo MD  9/16/2018

## 2018-09-16 NOTE — PROGRESS NOTES
Dr. Carolyne Voss paged to bedside after SBP's dropped into the 40's at 12:00  Bicarb push given, Víctor increased to 100 mcg/min, see MAR  Dr. Bonny Crawley at the bedside at 12:10   Updated Dr. Carolyne Voss on pt status  R pupil larger than L puil & nonreactive,  Bilateral radial pulses continue to be absent  Bilateral brachial pulses present per doppler  Draw ABG, prn order already in place

## 2018-09-16 NOTE — SIGNIFICANT EVENT
Pt is under comfort measures. Monitor shows flat line. Vent stopped, no spontaneous  breathing. No heart beats , no dolls eye  Pupils are fixed dilated  I pronounce the patient to be dead at 16:36 on 9/16/18  Family at bedside.

## 2018-09-16 NOTE — PROGRESS NOTES
Dr. Esther Kim at the bedside   Updated on pt status, current pressors, fluid volume, & completed measures  No new orders received  Dr. Aide Laboy updated family

## 2018-09-16 NOTE — PROGRESS NOTES
Vascular resident at bedside, ABG drawn, revealed metabolic acidosis w/ co2 96.2, bicarb 15.8, base excess -11.6. Claudia Duggan sent out to Nephrology.  Awaiting call back

## 2018-09-16 NOTE — PROGRESS NOTES
Dr. Eufemia Virk at the bedside  Pt continuing to have pressure issues  ART line SBP 60's, appears to not be reading accurately, do not treat pressure off ART line per Dr. Rafia Sweeney SBP 80's,   Give 2 amps of Bicarb  Start dopamine gtt

## 2018-09-16 NOTE — PROGRESS NOTES
Dr. Idalmis Ivory updated pt's family on poor prognosis  Code status changed to [facility]  Family at the bedside   CVVH withdrawn at 15:50

## 2018-09-16 NOTE — CONSULTS
Attestation signed by      Attending Physician Statement:    I have discussed the care of  Dulcy Galeazzi , including pertinent history and exam findings, with the Cardiology fellow/resident. I have seen and examined the patient and the key elements of all parts of the encounter have been performed by me. I agree with the assessment, plan and orders as documented by the fellow/resident, after I modified exam findings and plan of treatments, and the final version is my approved version of the assessment. Additional Comments:     Critically ill  Continue current management  Hard to add any BB or any diuretics  DIC, shock, and extensive PVD    Antonette Blanca MD       [facility]   Consultation Note               Today's Date: 9/16/2018  Patient Name: Dulcy Galeazzi  Date of admission: 9/13/2018 10:12 PM  Patient's age: 60 y.o., 1960  Admission Dx: Arterial occlusion [I70.90]  Ischemia of left lower extremity [I99.8]    Reason for Consult:  Shock    Requesting Physician: Citlaly Aldana MD    CHIEF COMPLAINT:  Acute left lower extremity ischemia    History Obtained From:  reason patient could not give history:  on ventilator    HISTORY OF PRESENT ILLNESS:      The patient is a 60 y.o.  male who presented to [facility] for left leg pain. He had acute sharp LLE pain from his hip to foot that started around 8 PM on 9/13/2018 and was associated with numbness and pain worsened with movement. He apparently had stents in both leg and those were done by his Cardiologist.     He was found to have acute left lower limb ischemia and was transferred for emergent surgical intervention. He underwent emergent left leg thrombectomy, right common iliac/aorta thrombectomy, left axillary to femoral bypass, left and right fem-fem bypass, fasciotomy. He was intubated and sedated and has remained in that condition since.  He was taken back to the OR given loss of doppler of right leg on 9/14/2018 and had:  1) Continuous  sodium bicarbonate 8.4 % injection 50 mEq, 50 mEq, Intravenous, PRN  piperacillin-tazobactam (ZOSYN) 2.25 g in dextrose 5 % 50 mL IVPB (mini-bag), 2.25 g, Intravenous, Q6H  pantoprazole (PROTONIX) injection 40 mg, 40 mg, Intravenous, BID **AND** sodium chloride (PF) 0.9 % injection 10 mL, 10 mL, Intravenous, Daily  dextrose 50 % solution 25 g, 25 g, Intravenous, PRN  sodium bicarbonate 150 mEq in dextrose 5 % 1,000 mL infusion, , Intravenous, Continuous  insulin regular (HUMULIN R;NOVOLIN R) 100 Units in sodium chloride 0.9 % 100 mL infusion, 0.5 Units/hr, Intravenous, Continuous  prismaSATE BK 0/3.5 dialysis solution, 1,500 mL/hr, Dialysis, Continuous  calcium gluconate 1 g in dextrose 5 % 100 mL IVPB, 1 g, Intravenous, PRN **OR** calcium gluconate 2 g in dextrose 5 % 100 mL IVPB, 2 g, Intravenous, PRN **OR** calcium gluconate 3 g in dextrose 5 % 100 mL IVPB, 3 g, Intravenous, PRN **OR** calcium gluconate 4 g in dextrose 5 % 100 mL IVPB, 4 g, Intravenous, PRN  oxyCODONE-acetaminophen (PERCOCET) 5-325 MG per tablet 1 tablet, 1 tablet, Oral, Q4H PRN **OR** oxyCODONE-acetaminophen (PERCOCET) 5-325 MG per tablet 2 tablet, 2 tablet, Oral, Q4H PRN  sodium chloride flush 0.9 % injection 10 mL, 10 mL, Intravenous, 2 times per day  sodium chloride flush 0.9 % injection 10 mL, 10 mL, Intravenous, PRN  ondansetron (ZOFRAN) injection 4 mg, 4 mg, Intravenous, Q6H PRN  glucose (GLUTOSE) 40 % oral gel 15 g, 15 g, Oral, PRN  dextrose 50 % solution 12.5 g, 12.5 g, Intravenous, PRN  glucagon (rDNA) injection 1 mg, 1 mg, Intramuscular, PRN  dextrose 5 % solution, 100 mL/hr, Intravenous, PRN  glucose (GLUTOSE) 40 % oral gel 15 g, 15 g, Oral, PRN  dextrose 50 % solution 12.5 g, 12.5 g, Intravenous, PRN  glucagon injection 1 mg, 1 mg, Intramuscular, PRN  dextrose 5 % solution, 100 mL/hr, Intravenous, PRN  norepinephrine (LEVOPHED) 16 mg in dextrose 5% 250 mL infusion, 2 mcg/min, Intravenous, Continuous  vasopressin 20 Units in dextrose 5 % 100 mL infusion, 4 Units/hr, Intravenous, Continuous  fentaNYL 20 mcg/mL Infusion, 25 mcg/hr, Intravenous, Continuous      Allergies:  Patient has no known allergies. Social History:   reports that he quit smoking about 4 years ago. His smoking use included Cigarettes. He has a 80.00 pack-year smoking history. He has never used smokeless tobacco. He reports that he does not drink alcohol or use drugs. Family History: family history includes Anemia in his mother. No h/o sudden cardiac death. No for premature CAD      REVIEW OF SYSTEMS:    Unable to obtain due to intubation and sedation      PHYSICAL EXAM:      BP 84/67   Pulse 114   Temp 97.8 °F (36.6 °C) (Axillary)   Resp 18   Ht 6' (1.829 m)   Wt 285 lb 4.4 oz (129.4 kg)   SpO2 99%   BMI 38.69 kg/m²    Constitutional and General Appearance: Intubated, sedated  HEENT: ET tube and OG tube in place  Respiratory:  · Normal excursion and expansion without use of accessory muscles  · Resp Auscultation: Fair respiratory effort. No increased work of breathing. · Decreased air entry bilaterally  Cardiovascular:  · Normal S1 and S2. Tachycardia  · Jugular venous pulsation Normal  · Peripheral pulses are symmetrical and full   Abdomen:   · Soft  · No tenderness  · Bowel sounds present  Extremities:  · Right wrist arterial line  · Bilateral lower extremity dressing in place  · Both feet have cyanosis  Neurologic:  · Intubated and sedated      DATA:    Diagnostics:    EK/15/18  Sinus tachycardia, Low voltage QRS      ECHO: pending       Labs:     CBC:   Recent Labs      09/15/18   2235  18   0415   WBC  40.6*  40.0*   HGB  11.1*  10.3*   HCT  34.3*  31.9*   PLT  313  286     BMP:   Recent Labs      18   0415  18   0830   NA  134*  141   K  6.9*  6.7*   CO2  16*  24   BUN  38*  35*   CREATININE  4.31*  3.52*   LABGLOM  14*  18*   GLUCOSE  160*  140*     BNP: No results for input(s): BNP in the last 72 hours.   PT/INR: by:    Paulina Morocho MD   Fellow, [address]

## 2018-09-16 NOTE — PROGRESS NOTES
Pt. Underwent flexible sigmoidoscopy. During the procedure pt. Noted to be in SVT with Heart rate in 180-190's. Placed on Life Pack, 6 mg adenosine given per verbal order of Dr. Juan Menchaca, vascular resident. Given at [phone] by [facility]. Adenosine momentarily slowed heart rate down, where it was noticed that pt. was in Atrial fibrillation with rapid ventricular response. Dr. Juan Menchaca instructed writer to call cardiology for intervention. Dr. Genoveva Dotson, cardiology fellow called, given update regarding pt condition. Telephone order received for one time dose of 250 mcg of Digoxin IV. Given at [phone] by [facility]. Digoxin unsuccessful and pt. Still noted to be atrial fibrillation with rapid ventricular response. Writer again called Dr. Alyssa Joe, cardiology fellow. Writer instructed to call [facility] to have a emergency physician to do cardioversion. [facility] called at 1510. Dr. Elana Everett, responded to rapid response. Verbal order for cardioversion received per Dr. Elana Everett. Pt. Heart rate synced with Life pack and charged per usual fashion. Once pt noted to be clear of bystanders, Cardioversion completed at [address] at 1513. Pt. Noted to covert to sinus tachycardia with a heart rate of 116. Dr. Pebbles Baez, palliative care at bedside post cardioversion. Informed writer he will go speak with family regarding poor prognosis and change in code status.

## 2018-09-16 NOTE — PROGRESS NOTES
Updated Dr. Nivia Torres, RN & RT couldn't hear left radial w/ doppler or see movement w/ ultrasound. Stated she will come to bedside to evaluate.

## 2018-09-16 NOTE — PLAN OF CARE
Problem: Nutrition  Goal: Optimal nutrition therapy  Outcome: Ongoing  Nutrition Problem: Inadequate oral intake  Intervention: Food and/or Nutrient Delivery: Start Tube Feeding  Nutritional Goals: initiation of nutrition support within 48 hours
Problem: OXYGENATION/RESPIRATORY FUNCTION  Goal: Patient will maintain patent airway  Outcome: Ongoing    Goal: Patient will achieve/maintain normal respiratory rate/effort  Respiratory rate and effort will be within normal limits for the patient   Outcome: Ongoing      Problem: MECHANICAL VENTILATION  Goal: Patient will maintain patent airway  Outcome: Ongoing    Goal: Oral health is maintained or improved  Outcome: Ongoing    Goal: ET tube will be managed safely  Outcome: Ongoing    Goal: Ability to express needs and understand communication  Outcome: Ongoing    Goal: Mobility/activity is maintained at optimum level for patient  Outcome: Ongoing      Problem: RESPIRATORY  Intervention: Administer treatments as ordered  BRONCHOSPASM/BRONCHOCONSTRICTION     [x]         IMPROVE AERATION/BREATH SOUNDS  [x]   ADMINISTER BRONCHODILATOR THERAPY AS APPROPRIATE  [x]   ASSESS BREATH SOUNDS  []   IMPLEMENT AEROSOL/MDI PROTOCOL  [x]   PATIENT EDUCATION AS NEEDED
Problem: RESPIRATORY  Intervention: Respiratory assessment  Problem: OXYGENATION/RESPIRATORY FUNCTION  Goal: Patient will maintain patent airway  Outcome: Ongoing  Goal: Patient will achieve/maintain normal respiratory rate/effort  Respiratory rate and effort will be within normal limits for the patient   Outcome: Ongoing     Problem: MECHANICAL VENTILATION  Goal: Patient will maintain patent airway  Outcome: Ongoing  Goal: Oral health is maintained or improved  Outcome: Ongoing  Goal: ET tube will be managed safely  Outcome: Ongoing  Goal: Ability to express needs and understand communication  Outcome: Ongoing  Goal: Mobility/activity is maintained at optimum level for patient  Outcome: Ongoing     Problem: ASPIRATION PRECAUTIONS  Goal: Patients risk of aspiration is minimized  Outcome: Ongoing     Problem: SKIN INTEGRITY  Goal: Skin integrity is maintained or improved  Outcome: Ongoing     Problem: RESPIRATORY  Intervention: Respiratory assessment  BRONCHOSPASM/BRONCHOCONSTRICTION       [X]  IMPROVE AERATION/BREATH SOUNDS  [X]        ADMINISTER BRONCHODILATOR THERAPY AS APPROPRIATE  [X]        ASSESS BREATH SOUNDS  [ ]         IMPLEMENT AEROSOL/MDI PROTOCOL  [X]        PATIENT EDUCATION AS NEEDED
Problem: Restraint Use - Nonviolent/Non-Self-Destructive Behavior:  Goal: Absence of restraint indications  Absence of restraint indications   Outcome: Ongoing      Problem: Falls - Risk of:  Goal: Will remain free from falls  Will remain free from falls   Outcome: Ongoing  Keeping a clear pathway, education on call light use, anticipating needs, bed alarms    Problem: Risk for Impaired Skin Integrity  Goal: Tissue integrity - skin and mucous membranes  Structural intactness and normal physiological function of skin and  mucous membranes.    Outcome: Ongoing  Turns Q2H, lotion to skin, inspecting skin daily, encouraging protein consumption
0.99

## 2018-09-16 NOTE — PROGRESS NOTES
pantoprazole (PROTONIX) injection 40 mg BID   And    sodium chloride (PF) 0.9 % injection 10 mL Daily   dextrose 50 % solution 25 g PRN   sodium bicarbonate 150 mEq in dextrose 5 % 1,000 mL infusion Continuous   insulin regular (HUMULIN R;NOVOLIN R) 100 Units in sodium chloride 0.9 % 100 mL infusion Continuous   prismaSATE BK 0/3.5 dialysis solution Continuous   calcium gluconate 1 g in dextrose 5 % 100 mL IVPB PRN   Or    calcium gluconate 2 g in dextrose 5 % 100 mL IVPB PRN   Or    calcium gluconate 3 g in dextrose 5 % 100 mL IVPB PRN   Or    calcium gluconate 4 g in dextrose 5 % 100 mL IVPB PRN   oxyCODONE-acetaminophen (PERCOCET) 5-325 MG per tablet 1 tablet Q4H PRN   Or    oxyCODONE-acetaminophen (PERCOCET) 5-325 MG per tablet 2 tablet Q4H PRN   sodium chloride flush 0.9 % injection 10 mL 2 times per day   sodium chloride flush 0.9 % injection 10 mL PRN   ondansetron (ZOFRAN) injection 4 mg Q6H PRN   glucose (GLUTOSE) 40 % oral gel 15 g PRN   dextrose 50 % solution 12.5 g PRN   glucagon (rDNA) injection 1 mg PRN   dextrose 5 % solution PRN   glucose (GLUTOSE) 40 % oral gel 15 g PRN   dextrose 50 % solution 12.5 g PRN   glucagon injection 1 mg PRN   dextrose 5 % solution PRN   norepinephrine (LEVOPHED) 16 mg in dextrose 5% 250 mL infusion Continuous   vasopressin 20 Units in dextrose 5 % 100 mL infusion Continuous   fentaNYL 20 mcg/mL Infusion Continuous         LABS    CBC: Recent Labs      09/15/18   0337  09/15/18   2235  09/16/18   0415   WBC  40.5*  40.6*  40.0*   RBC  4.70  3.71*  3.41*   HGB  13.9  11.1*  10.3*   HCT  42.9  34.3*  31.9*   MCV  91.3  92.5  93.5   MCH  29.6  29.9  30.2   MCHC  32.4  32.4  32.3   RDW  14.0  14.2  14.2   PLT  378  313  286   MPV  11.6  12.1  12.3      BMP: Recent Labs      09/16/18   0012  09/16/18   0401  09/16/18   0415  09/16/18   0830   NA  136   --   134*  141   K  5.4*   --   6.9*  6.7*   CL  99   --   97*  95*   CO2  16*   --   16*  24   BUN  40*   --   38*  35* Tubular Necrosis secondary to multiple contrast exposures including CT scans and angiograms, in addition to hypotension. 2. Rhabdomyolysis  3. Ischemic right lower extremity  4. Metabolic Acidosis  5. Respiratory Failure  6. Hypotensive shock  7. Severe PVD  8. Hyperkalemia     Plan:  1. Continue CVVHD. Will discuss flow rate with Dr. Opal Rodriguez  2. Continue BiCarb drip  3. Continue pressor support  4. Poor prognosis    Please do not hesitate to call with questions. Electronically signed by Jessie Devine CNP on 9/16/2018 at 11:22 AM     No attending visit/charge as I was unable to see the patient today prior to his expiration.     Ish Mantilla MD  Nephrology Attending  [facility]

## 2018-09-16 NOTE — CONSULTS
NEUROLOGIC: intubated, sedated, not following commands . SKIN: necrotic feet b/l. CBC with Differential:    Lab Results   Component Value Date    WBC 29.3 09/16/2018    RBC 2.83 09/16/2018    HGB 5.9 09/16/2018    HCT 18.8 09/16/2018     09/16/2018    MCV 94.3 09/16/2018    MCH 30.0 09/16/2018    MCHC 31.8 09/16/2018    RDW 14.2 09/16/2018    LYMPHOPCT 9 09/15/2018    MONOPCT 11 09/15/2018    BASOPCT 0 09/15/2018    MONOSABS 4.46 09/15/2018    LYMPHSABS 3.65 09/15/2018    EOSABS 0.00 09/15/2018    BASOSABS 0.00 09/15/2018    DIFFTYPE NOT REPORTED 09/15/2018     BMP:    Lab Results   Component Value Date     09/16/2018    K 6.7 09/16/2018    CL 92 09/16/2018    CO2 19 09/16/2018    BUN 34 09/16/2018    LABALBU 1.3 09/16/2018    CREATININE 3.67 09/16/2018    CALCIUM 7.2 09/16/2018    GFRAA 21 09/16/2018    LABGLOM 17 09/16/2018    GLUCOSE 142 09/16/2018     Hepatic Function Panel:    Lab Results   Component Value Date    ALKPHOS 158 09/16/2018    ALT 4,508 09/16/2018    AST >7,000 09/16/2018    PROT 3.1 09/16/2018    BILITOT 1.41 09/16/2018    BILIDIR 0.72 09/15/2018    IBILI 0.24 09/15/2018    LABALBU 1.3 09/16/2018     Ionized Calcium:  No results found for: IONCA  Warfarin PT/INR:  No components found for: PTPATWAR, PTINRWAR  Last 3 Troponin:  No results found for: TROPONINI  ABG:  No results found for: PH, PCO2, PO2, HCO3, BE, THGB, TCO2, O2SAT  Fibrinogen Level:  No components found for: FIB    Pertinent Radiology:   Cta Abdominal Aorta W Bilat Runoff W Wo Contrast    Result Date: 9/14/2018  EXAMINATION: CTA OF THE ABDOMINAL AORTA WITH LOWER EXTREMITY RUNOFF, 9/13/2018 11:10 pm TECHNIQUE: CTA of the abdomen, pelvis and bilateral lower extremities was performed after the administration of intravenous contrast.   Multiplanar reformatted images are provided for review. MIP images are provided for review.  Additional reconstructions on a separate workstation including volume rendered images and curved planar reformats. Dose modulation, iterative reconstruction, and/or weight based adjustment of the mA/kV was utilized to reduce the radiation dose to as low as reasonably achievable. COMPARISON: None HISTORY: ORDERING SYSTEM PROVIDED HISTORY: Arterial occlusion Peripheral vascular disease, left lower extremity ischemia. FINDINGS: Nonvascular Lower Chest: Mild bibasilar atelectasis and subtle lower lobe bronchiectasis. Organs: Early arterial phase images of the solid abdominal organs show diffuse hepatic steatosis with areas of regional sparing. Spleen, adrenal glands (slightly thickened on the left), gallbladder, and pancreas show no acute findings. Both kidneys excrete contrast without hydronephrosis. Contrast in the renal collecting systems and bladder is presumably from a prior injection. GI/Bowel: No CT evidence of appendicitis. No signs of bowel obstruction. Mildly distended stomach. Pelvis: No free fluid. Subtle focal enhancement at the base of the penis. No significant bulky adenopathy. Peritoneum/Retroperitoneum: Borderline periportal/peripancreatic nodes without significant bulky adenopathy. Bones/Soft Tissues: Prominent focal fat in the right lateral abdominal wall musculature versus a small lipoma. Small lipoma in the right proximal lateral thigh/vastus lateralis. Mild osseous degenerative changes. VASCULAR Occlusion of the aorta just below level of the renal arteries. Celiac trunk and superior mesenteric arteries show no high-grade stenosis. Inferior mesenteric artery fills via collaterals and is occluded at its origin. Suspect a mild stenosis of the right renal artery. Both kidneys enhance. Bilateral common iliac stents, the right protruding into the caudal aorta, are occluded. Distal reconstitution of the small caliber right external iliac artery and distal left external iliac artery. Common femoral arteries are small with plaque bilaterally.   The left distal common femoral artery is occluded at its bifurcation. Right superficial femoral artery is somewhat small but patent. The right popliteal artery and below-knee proximal trifurcation vessels are filled. Distal vessels are not well seen. The left superficial femoral artery and popliteal artery are occluded. Some profunda branches are filled. Below-knee vessels are not opacified. Occluded infrarenal abdominal aorta including bilateral common iliac artery stents. Reconstitution of the right external iliac artery with continuous flow distally visualized to the ankle. Segmental reconstitution of the left distal external iliac artery/common femoral artery with occlusion of the common femoral artery and no significant flow visualized below this level. Critical results were conveyed to [facility] at 3:10am on 9/14/2018. Xr Chest Portable    Result Date: 9/14/2018  EXAMINATION: SINGLE XRAY VIEW OF THE CHEST 9/14/2018 9:00 pm COMPARISON: None. HISTORY: ORDERING SYSTEM PROVIDED HISTORY: central line placement TECHNOLOGIST PROVIDED HISTORY: central line placement FINDINGS: The cardiomediastinal silhouette is unremarkable. Dependent left lower lobe opacification with blunting of the costophrenic sulcus suggesting effusion. The right lung is clear. No pneumothorax. Right IJ line tip in the SVC. Endotracheal tube tip approximately 6.6 cm above the domi. Enteric catheter is in place with tip in the gastric body distally. Left lower lobe opacification and possible effusion. Central line tip in the SVC with no pneumothorax. Satisfactory position of endotracheal tube.          ASSESSMENT:  Active Hospital Problems    Diagnosis Date Noted    Shock liver [K72.00] 09/16/2018    Hyperkalemia [E87.5] 95/09/5839    Metabolic acidosis [L11.7] 09/16/2018    Ischemia of right lower extremity [I99.8] 09/15/2018    Acute renal failure (ARF) (HCC) [N17.9] 09/15/2018    Rhabdomyolysis [M62.82] 09/15/2018    ATN (acute tubular necrosis) ([facility] [address]

## 2018-09-16 NOTE — PROGRESS NOTES
New consult to Cardiology per Dr. Juan Carlos White. Perfect serve sent Dr. Austyn Ventura, cardiology fellow.

## 2018-09-16 NOTE — PROGRESS NOTES
RT to bedside, per Ivette Frankel MD Imel requests arterial line placement. Art line already present R radial so attempt to be made L radial artery. Writer could not palpate L radial pulse, could not hear L radial pulse with doppler, and could not visualize L radial pulse with ultrasound. Writer will not attempt to place art line. GURJIT Garces Layman and charge RT Eloy Marsh informed.

## 2018-09-16 NOTE — PROGRESS NOTES
Pharmacy Note     Renal Dose Adjustment    Karyle Smiles is a 60 y.o. male. Pharmacist assessment of renally cleared medications. Recent Labs      09/16/18   0012  09/16/18   0415   BUN  40*  38*       Recent Labs      09/16/18   0401  09/16/18   0415   CREATININE  5.37*  4.31*       Estimated Creatinine Clearance: 26 mL/min (A) (based on SCr of 4.31 mg/dL (H)). Height:   Ht Readings from Last 1 Encounters:   09/13/18 6' (1.829 m)     Weight:  Wt Readings from Last 1 Encounters:   09/15/18 285 lb 4.4 oz (129.4 kg)       The following medication dose has been adjusted based upon renal function per P&T Guidelines:             Zosyn adjusted to 2.25g q6 for CVVHD.     Do RodriguezD, BCPS 9/16/2018 9:02 AM

## 2018-09-16 NOTE — CONSULTS
Palliative Care Inpatient Consult    NAME:  Esperanza Rosenberg RECORD NUMBER:  [id number]  AGE: 60 y.o. GENDER: male  : 1960  TODAY'S DATE:  2018    Reason for Consult:  symptom management and goals of care  History of Present Illness     The patient is a 60 y.o. Non-/non  male who presents with Other (pulseless left leg)      Referred to Palliative Care by  [x] Physician   [] Nursing  [] Family Request   [] Other:      He was admitted to the vascular service for Arterial occlusion [I70.90]  Ischemia of left lower extremity [I99.8]. His hospital course has been associated with acute ischemic colitis, spesis , cardiovascular collapse .  The patient has a complicated medical history and has been hospitalized since 2018 10:12 PM.    Active Hospital Problems    Diagnosis Date Noted    Shock liver [K72.00] 2018    Hyperkalemia [E87.5]     Metabolic acidosis [B89.3] 2018    Ischemia of right lower extremity [I99.8] 09/15/2018    Acute renal failure (ARF) (HCC) [N17.9] 09/15/2018    Rhabdomyolysis [M62.82] 09/15/2018    ATN (acute tubular necrosis) ([facility] [address] [N17.0]     Oliguria [R34]     Arterial occlusion [I70.90] 2018    Ischemia of left lower extremity [I99.8] 2018    Essential hypertension with goal blood pressure less than 130/80 [I10] 2016       PAST MEDICAL HISTORY      Diagnosis Date    COPD [facility]chronic obstructive [facility] disease) [facility]     Hyperlipemia     Peripheral vascular disease (HCC)     Peripheral vascular disease s/p right iliac stent (17)       PAST SURGICAL HISTORY  Past Surgical History:   Procedure Laterality Date    CARDIAC CATHETERIZATION         SOCIAL HISTORY  Social History   Substance Use Topics    Smoking status: Former Smoker     Packs/day: 2.00     Years: 40.00     Types: Cigarettes     Quit date: 2014    Smokeless tobacco: Never Used    Alcohol use No       ALLERGIES  No Known MD Tom  Palliative Care Team  on 9/16/2018 at 3:33 PM    Palliative care office: [phone]

## 2018-09-16 NOTE — PROGRESS NOTES
3rd attempt to reach patient's wife, successful. She will be coming in shortly. Physician notified.  paged.

## 2018-09-16 NOTE — PROGRESS NOTES
and no murmur noted  ABDOMEN:   No scars, no bowel sounds, soft, non-distended, non-tender, no masses palpated, no hepatosplenomegaly    NEURO[de-identified]   Intubated and sedated. SKIN:   Ischemic toes and foot b/l   EXTREMITIES:  Right sided femoral signal noted, bilateral popliteal signals noted. No DP/PT signal bilaterally. Left leg wrapped.        MEDICATIONS:  Scheduled Meds:   piperacillin-tazobactam  2.25 g Intravenous Q6H    fentanNYL  25 mcg Intravenous Once    sodium chloride flush  10 mL Intravenous 2 times per day    famotidine (PEPCID) injection  20 mg Intravenous BID    sodium chloride  250 mL Intravenous Once     Continuous Infusions:   DOPamine Stopped (09/16/18 0844)    phenylephrine (JOSE-SYNEPHRINE) 50mg/250mL infusion      sodium bicarbonate infusion 250 mL/hr at 09/16/18 0530    insulin (HUMAN R) non-weight based infusion 1.26 Units/hr (09/16/18 0902)    prismaSATE BK 0/3.5 1,500 mL/hr (09/16/18 0458)    sodium chloride      midazolam Stopped (09/16/18 0402)    dextrose      argatroban infusion Stopped (09/16/18 0453)    dextrose      norepinephrine 15 mcg/min (09/16/18 0528)    sodium chloride 100 mL/hr at 09/14/18 1908    vasopressin infusion 3 Units/hr (09/16/18 0326)    fentaNYL 25 mcg/hr (09/16/18 0528)     PRN Meds:     albuterol sulfate HFA 6 puff Q6H PRN   sodium bicarbonate 50 mEq PRN   dextrose 25 g PRN   calcium gluconate IVPB 1 g PRN   Or     calcium gluconate IVPB 2 g PRN   Or     calcium gluconate IVPB 3 g PRN   Or     calcium gluconate IVPB 4 g PRN   oxyCODONE-acetaminophen 1 tablet Q4H PRN   Or     oxyCODONE-acetaminophen 2 tablet Q4H PRN   sodium chloride flush 10 mL PRN   ondansetron 4 mg Q6H PRN   glucose 15 g PRN   dextrose 12.5 g PRN   glucagon (rDNA) 1 mg PRN   dextrose 100 mL/hr PRN   glucose 15 g PRN   dextrose 12.5 g PRN   glucagon (rDNA) 1 mg PRN   dextrose 100 mL/hr PRN       SUPPORT DEVICES: [x] Ventilator [] BIPAP  [] Nasal Cannula [] Room Air    VENT SETTINGS (Comprehensive) (if applicable):  Vent Information  Ventilator Started: Yes  Vent Type: Servo i  Vent Mode: PRVC  Vt Ordered: 610 mL  Rate Set: 18 bmp  FiO2 : 60 %  Sensitivity: 3  PEEP/CPAP: 5  I Time/ I Time %: 0.7 s  Cuff Pressure (cm H2O): 22 cm H2O  Humidification Source: HME  Additional Respiratory  Assessments  Pulse: 114  Resp: 18  SpO2: 99 %  End Tidal CO2: 32 (%)  Position: Semi-Matson's  Humidification Source: HME  Oral Care Completed?: Yes  Oral Care: Suction toothette, Mouth suctioned, Mouth swabbed  Subglottic Suction Done?: Yes  Cuff Pressure (cm H2O): 22 cm H2O  Skin barrier applied: Yes    ABGs:   Lab Results   Component Value Date    PHART 7.307 09/14/2018    YJE0RUF 42.1 09/14/2018    PO2ART 105.0 09/14/2018    BMU1JLP 20.5 09/14/2018    SAP1NYF 17 09/16/2018    M8ZIRFWW 97.6 09/14/2018    FIO2 60.0 09/16/2018         DATA:  Complete Blood Count:   Recent Labs      09/15/18   0337  09/15/18   2235  09/16/18   0415   WBC  40.5*  40.6*  40.0*   RBC  4.70  3.71*  3.41*   HGB  13.9  11.1*  10.3*   HCT  42.9  34.3*  31.9*   MCV  91.3  92.5  93.5   MCH  29.6  29.9  30.2   MCHC  32.4  32.4  32.3   RDW  14.0  14.2  14.2   PLT  378  313  286   MPV  11.6  12.1  12.3        Last 3 Blood Glucose:   Recent Labs      09/13/18   2224  09/14/18   1155  09/14/18   1741  09/15/18   0337  09/15/18   1244  09/15/18   2235  09/16/18   0012  09/16/18   0415   GLUCOSE  180*  153*  177*  218*  208*  186*  152*  160*        PT/INR:    Lab Results   Component Value Date    PROTIME 120.5 09/15/2018    INR 13.3 09/15/2018     PTT:    Lab Results   Component Value Date    APTT >120.0 09/16/2018       Comprehensive Metabolic Profile:   Recent Labs      09/14/18   1155   09/15/18   1244   09/15/18   2235  09/16/18   0012  09/16/18   0401  09/16/18   0415   NA  140   < >  138   --   137  136   --   134*   K  4.7   < >  5.1   --   5.7*  5.4*   --   6.9*   CL  102   < >  107   --   100  99   --   97*   CO2  21   < >  18* Culture:  No components found for: CSPUTUM    Radiology/Imaging:  Impression   Left internal jugular central venous catheter placement with the tip at the   level of the distal SVC. ASSESSMENT:     Patient Active Problem List    Diagnosis Date Noted    Shock liver 09/16/2018    Hyperkalemia 46/41/2166    Metabolic acidosis 68/52/1568    Ischemia of right lower extremity 09/15/2018    Acute renal failure (ARF) ([facility] 09/15/2018    Rhabdomyolysis 09/15/2018    ATN (acute tubular necrosis) ([facility]     Oliguria     Arterial occlusion 09/14/2018    Ischemia of left lower extremity 09/14/2018    Hx of tobacco use, presenting hazards to health 11/29/2017    Essential hypertension with goal blood pressure less than 130/80 06/08/2016    Peripheral vascular disease ([facility] 11/20/2013    [facility] 01/12/2012    Hyperlipidemia 12/15/2011          PLAN:     WEAN PER PROTOCOL:  [x] No   [] Yes  [] N/A    ICU PROPHYLAXIS:  Stress ulcer:  [] PPI Agent  [x] S8Aptni [] Sucralfate  [] Other:  VTE:   [] Enoxaparin  [] Unfract. Heparin Subcut  [] EPC Cuffs    NUTRITION:  [x] NPO [] Tube Feeding (Specify: ) [] TPN  [] PO    HOME MEDS RECONCILED: [x] No  [] Yes    CONSULTATION NEEDED:  [] No  [x] Yes    FAMILY UPDATED:    [x] No  [] Yes    TRANSFER OUT OF ICU:   [x] No  [] Yes    Additional Assessment:  Principal Problem:    Arterial occlusion  Active Problems:    Essential hypertension with goal blood pressure less than 130/80    Ischemia of left lower extremity    Ischemia of right lower extremity    Acute renal failure (ARF) (HCC)    Rhabdomyolysis    ATN (acute tubular necrosis) ([facility])    Oliguria    Shock liver    Hyperkalemia    Metabolic acidosis  Resolved Problems:    * No resolved hospital problems. *  1. Ischemic R leg - s/p left leg thrombectomy, right common iliac/aorta thrombectomy, left axillary to femoral bypass, left and right fem-fem bypass, fasciotomy. 2. Smoker  3. HLD  4. Rhabdomyolysis  5. Acute renal failure due to ATN  6. Refractory acidosis  7. Refractory hyperkalemia   8. Leukocytosis   9. Shock liver   10. Metabolic acidosis  11. Acute hypoxic respiratory failure   12. Ischemic hepatitis   13. Rhabdomyolysis    Plan:  Cont mechanical ventilation  Patient was a difficult intubation. Will recommend to continue mechanical ventilation till acute issues resolve  Cont versed  for sedation   Argatroban has been stopped. Cont to monitor INR  Cont to monitor HB. Transfuse to keep Hb>7  Monitor LFTs    Recommend switching ancef to zosyn to cover gram negatives and pseudomonas due to acute condition  Daily ABGs  Nephrology consulted. On CVVHD  On pressors. Keep MAP >65   Cont to monitor CK levels daily. Trending up. On bicarb drip. Fluid management per nephrology. Prognosis is poor  Palliative has been consulted. Currently patient is  A full code. Vascular to get 2D ECHO to assess cardiac function. Will be consulting general surgery to assess for gut ischemia. Mary Kelly, PGY-3  Pulmonary & critical care. [place], [place]  9/16/2018 9:16 AM   Attending Physician Statement  I have discussed the care of Ariel Doll, including pertinent history and exam findings,  with the resident. I have seen and examined the patient and the key elements of all parts of the encounter have been performed by me. I agree with the assessment, plan and orders as documented by the resident with additions .   Progressive MOD agree likely ischemic colon   Poor prognoses   D/w Surgery team        Total critical care time caring for this patient with life threatening, unstable organ failure, including direct patient contact, management of life support systems, review of data including imaging and labs, discussions with other team members and physicians at least 27   Min so far today, excluding procedures. Treatment plan Discussed with nursing staff in detail , all questions answered . Electronically signed by Nehal Yin MD on   9/16/18 at 3:10 PM    Please note that this chart was generated using voice recognition Dragon dictation software. Although every effort was made to ensure the accuracy of this automated transcription, some errors in transcription may have occurred.

## 2018-09-17 LAB
ABO/RH: NORMAL
ACTION: NORMAL
ALLEN TEST: ABNORMAL
ANION GAP: 23 MMOL/L (ref 7–16)
ANTIBODY SCREEN: NEGATIVE
ARM BAND NUMBER: NORMAL
BLD PROD TYP BPU: NORMAL
CROSSMATCH RESULT: NORMAL
DATE AND TIME: NORMAL
DISPENSE STATUS BLOOD BANK: NORMAL
EKG ATRIAL RATE: 141 BPM
EKG P AXIS: 38 DEGREES
EKG P-R INTERVAL: 124 MS
EKG Q-T INTERVAL: 280 MS
EKG QRS DURATION: 80 MS
EKG QTC CALCULATION (BAZETT): 428 MS
EKG R AXIS: -2 DEGREES
EKG T AXIS: 14 DEGREES
EKG VENTRICULAR RATE: 141 BPM
EXPIRATION DATE: NORMAL
FIO2: 60
GFR NON-AFRICAN AMERICAN: 11 ML/MIN
GFR NON-AFRICAN AMERICAN: 13 ML/MIN
GFR SERPL CREATININE-BSD FRML MDRD: 13 ML/MIN
GFR SERPL CREATININE-BSD FRML MDRD: 16 ML/MIN
GFR SERPL CREATININE-BSD FRML MDRD: ABNORMAL ML/MIN/{1.73_M2}
GFR SERPL CREATININE-BSD FRML MDRD: ABNORMAL ML/MIN/{1.73_M2}
GLUCOSE BLD-MCNC: 168 MG/DL (ref 74–100)
MODE: ABNORMAL
NEGATIVE BASE EXCESS, ART: 6 (ref 0–2)
NOTIFY: NORMAL
O2 DEVICE/FLOW/%: ABNORMAL
PATIENT TEMP: ABNORMAL
POC CHLORIDE: 96 MMOL/L (ref 98–107)
POC CREATININE: 4.54 MG/DL (ref 0.51–1.19)
POC CREATININE: 5.37 MG/DL (ref 0.51–1.19)
POC HCO3: 19.5 MMOL/L (ref 21–28)
POC HEMATOCRIT: 15 % (ref 41–53)
POC HEMOGLOBIN: 5.1 G/DL (ref 13.5–17.5)
POC IONIZED CALCIUM: 0.86 MMOL/L (ref 1.15–1.33)
POC LACTIC ACID: 15.77 MMOL/L (ref 0.56–1.39)
POC O2 SATURATION: 93 % (ref 94–98)
POC PCO2 TEMP: ABNORMAL MM HG
POC PCO2: 38.7 MM HG (ref 35–48)
POC PH TEMP: ABNORMAL
POC PH: 7.31 (ref 7.35–7.45)
POC PO2 TEMP: ABNORMAL MM HG
POC PO2: 72.1 MM HG (ref 83–108)
POC POTASSIUM: 6.4 MMOL/L (ref 3.5–4.5)
POC POTASSIUM: 6.5 MMOL/L (ref 3.5–4.5)
POC SODIUM: 138 MMOL/L (ref 138–146)
POSITIVE BASE EXCESS, ART: ABNORMAL (ref 0–3)
READ BACK: NO
SAMPLE SITE: ABNORMAL
SURGICAL PATHOLOGY REPORT: NORMAL
TCO2 (CALC), ART: 21 MMOL/L (ref 22–29)
TRANSFUSION STATUS: NORMAL
UNIT DIVISION: 0
UNIT NUMBER: NORMAL

## 2018-09-17 NOTE — DISCHARGE SUMMARY
Preadmission diagnosis-acute left lower extremity ischemia    Post admission diagnosis-acute left lower extremity ischemia, aortic occlusion, hypercoagulable disease, ischemic left colon    Discharge-patient care was withdrawn and     Operations-open leg exploration on 2018 at midnight, take back surgery and exploration on 2018 the afternoon  Patient underwent sigmoidoscopy-18    Medications-please see chart for admission medications-no discharge medications. Hospital course:  Patient was identified to have acute lower extremity ischemia at an outside institution on 2018. He was transferred emergently to the institution where I admitted him and brought him urgently to the operating room for ischemia. At the course of the operation was identified that he had ischemia of the left lower extremity where he underwent fasciotomy and that a revascularization after diagnostic angiography via left axillary to left femoral artery and then a left to right femoral artery bypass. Postoperatively the patient had some improved signs of revascularization however approximately 5 hours postoperatively he thrombosed his bypass graft and was emergently taken to the operating room for exploration. At the second operation was appreciated that the patient had thrombosed his axillary as well as his femoral-femoral artery bypass. These were revascularized and diagnostic angiography and exploration demonstrated no inflow or outflow lesions. However during the course of the operation he read thrombosed his bypass graft raising the suspicion of acute hypercoagulable disease or HIV and as a result the patient was started on an argatroban drip reopened and underwent a right lower extremity fasciotomy. Over the next 48 hours the patient continued to not improve as expected and out of concern for elevated lactate underwent a flexible sigmoidoscopy on 18.   His appreciated that the patient had

## 2018-09-17 NOTE — PROCEDURES
[facility]                                  PROCEDURE NOTE    PATIENT NAME: JUAN RAMON Madden                         :        1960  MED REC NO:   [id number]                             ROOM:       6804  ACCOUNT NO:   [de-identified]                           ADMIT DATE: 2018  PROVIDER:     Nick Barnett MD    DATE OF PROCEDURE:  09/15/2018    PREOPERATIVE DIAGNOSIS:  Acute renal failure. POSTOPERATIVE DIAGNOSIS:  Acute renal failure. PROCEDURE PERFORMED:  Left internal jugular Benji dialysis catheter  placement under ultrasound. SURGEON:  Nick Barnett MD    ESTIMATED BLOOD LOSS:  Less than 5 mL. INDICATION FOR PROCEDURE:  The patient is a 69-year-old gentleman who  underwent major revascularization to bilateral lower extremities. Overnight, he developed rhabdomyolysis and acute renal failure and is in  need of CVVH. DESCRIPTION OF PROCEDURE:  The patient's left neck was prepped and draped  in standard sterile fashion. Was evaluate under ultrasound. The internal  jugular vein was compressible. It was accessed, and a wire was passed down  easily. A small skin nick was made, and the track was then dilated. A  20-cm long Benji dialysis catheter was then placed over a wire in  Seldinger technique. The wire was removed. All ports emmanuel back easily and  flushed, and then they were flushed with saline and cath sutured in place. Sterile dressing was applied. Post-procedure x-ray confirmed tip of  catheter at the atrio-caval junction. Okay to use dialysis catheter.         John Dobson MD    D: 09/15/2018 9:44:50       T: 09/15/2018 21:18:32     [email or web address]  Job#: [id number]     Doc#: [id number]    CC:
Arterial Line Placement Procedure Note    DATE: 9/13/2018  RE: Criselda Gee   1960    PREOPERATIVE DIAGNOSIS:  Hypotension    POSTOPERATIVE DIAGNOSIS:  Hypotension    INDICATION:  Need or invasive blood pressure monitoring. OPERATION PERFORMED:  Placement R radial arterial line     Performed by: Hollie Aldana PGY3    ANESTHESIA:  None    Procedure: Sterile technique was initiated (hand washing, gown, cap, mask, gloves, draping) before the skin over the right radial artery was prepped with chlorhexidine. The vessel was identified with ultra sound and under direct vision a 20 Ga. introducer needle and a guide wire was placed without difficulty. Then, a 18 Ga. catheter was easily threaded. Good waveform obtained on monitor and line withdrew and flushed well. A-line was  secured with skin sutures, and dressed. Complications: none.  Took two attempts     Keyur Shall  4:56 AM
OPERATIVE NOTE    DATE OF PROCEDURE: 9/15/2018     SURGEON:Mendel Jean-Baptiste DO    Preoperative diagnosis: hypotension, need for pressor support    Procedure: ultrasound guided right internal jugular vein A triple lumen catheter    Postoperative Diagnosis: Same    Anesthesia: Local    Assistant: none    Specimens: None    Complications:  None    EBL less than 5cc     This patient is a 60y.o. year old Male who needs a central line. The patient is placed into Trendelenberg position and the local area is prepped and draped in typical sterile fashion, the ultrasound is draped into the field. The local skin and subcutaneous tissue is anesthetized with 1% lidocaine. The ultrasound is used to identify the internal jugular vein and the 17 gauge is inserted into the vein the guidewire is inserted without complication. The tract is incised with a #11 blade and the tract is dilated. The A triple lumen catheter is inserted over the guidewire in typical seldinger fashion, and the line is sutured into place with a 2-0 silk suture. The ports flush and draw readily and a central line dressing is placed. Chest X ray is obtained and demonstrate tip of CVC in the SVC.      Electronically signed by Cortes Casarez DO on 9/15/2018 at 1:03 AM
During the procedure pt noted to be tachycardic in 150s with hypotension sbp 60-70s, a 4th vasopressor was added during the procedure. At completion the findings were discussed with his wife and family and recommendations for changing goal of care to comfort measures recommend as he would most likely not survive an operation. Wife tearful but understanding, requested time to discuss with family members. Dr. Mayra Zaragoza was present for the entirety of the procedure.        Electronically signed by Emily Cooper DO  on 9/16/2018 at 10:54 PM

## 2018-09-19 NOTE — PROGRESS NOTES
Preliminary cause of death:      Patient was admitted with lower extremity ischemia and underwent several revascularizations. Patient was identified to have hypercoagulable state likely causing multiple thrombosis within the native internal vessels. Patient had an occluded abdominal aorta. Likely cause of death is related to aortic occlusion leading to lower extremity ischemia. Hypercoagulable state of unknown etiology but to the need for revascularization procedures to be revised. Multiple thromboses likely cause thrombosis of the mesenteric vessels leading to and organ ischemia. Patient's family removed him from life support.

## 2018-09-20 NOTE — OP NOTE
During the procedure pt noted to be tachycardic in 150s with hypotension sbp 60-70s, a 4th vasopressor was added during the procedure. At completion the findings were discussed with his wife and family and recommendations for changing goal of care to comfort measures recommend as he would most likely not survive an operation.  Wife tearful but understanding, requested time to discuss with family members.      Dr. Tena Yanez was present for the entirety of the procedure.         Electronically signed by Alex Garza DO  on 9/16/2018 at 10:54 PM

## 2020-01-16 NOTE — ED NOTES
Pt to [facility], RN  09/13/18 3622 Quality 130: Documentation Of Current Medications In The Medical Record: Current Medications Documented

## (undated) DEVICE — COVER,LIGHT HANDLE,FLX,2/PK: Brand: MEDLINE INDUSTRIES, INC.

## (undated) DEVICE — C-ARM: Brand: UNBRANDED

## (undated) DEVICE — SUTURE MCRYL + SZ 4 0 L18IN ABSRB UD PC 3 L16MM 3 8 CIR PRIM MCP845G

## (undated) DEVICE — SUTURE NONABSORBABLE MONOFILAMENT 7-0 BV-1 1X24 IN PROLENE 8702H

## (undated) DEVICE — SUTURE ETHLN SZ 3-0 L18IN NONABSORBABLE BLK L24MM PS-1 3/8 1663G

## (undated) DEVICE — Z DISCONTINUED BY MEDLINE USE 2711682 TRAY SKIN PREP DRY W/ PREM GLV

## (undated) DEVICE — SUTURE VCRL SZ 3-0 L27IN ABSRB UD L26MM SH 1/2 CIR J416H

## (undated) DEVICE — GLOVE SURG SZ 8 CRM LTX FREE POLYISOPRENE POLYMER BEAD ANTI

## (undated) DEVICE — SVMMC VASC MAJ PK

## (undated) DEVICE — PRESSURE MONITORING LINES 4FT. (122CM) M/F: Brand: PRESSURE MONITORING LINES

## (undated) DEVICE — CONTAINER,SPECIMEN,4OZ,OR STRL: Brand: MEDLINE

## (undated) DEVICE — SUTURE NONABSORBABLE MONOFILAMENT 6-0 C-1 1X30 IN PROLENE 8706H

## (undated) DEVICE — DRAPE,UTILITY,XL,4/PK,STERILE: Brand: MEDLINE

## (undated) DEVICE — CONTAINER SPEC 33OZ URIN COLL BIODEGRADABLE PAPER DISP

## (undated) DEVICE — PINNACLE R/O II INTRODUCER SHEATH WITH RADIOPAQUE MARKER: Brand: PINNACLE

## (undated) DEVICE — GLOVE SURG SZ 7 L12IN FNGR THK87MIL WHT LTX FREE

## (undated) DEVICE — FOGARTY THRU-LUMEN EMBOLECTOMY CATHETER 3F 40CM: Brand: FOGARTY

## (undated) DEVICE — MICROPUNCTURE INTRODUCER SET SILHOUETTE TRANSITIONLESS PUSH-PLUS DESIGN - STIFFENED CANNULA WITH NITINOL WIRE GUIDE: Brand: MICROPUNCTURE

## (undated) DEVICE — GLOVE SURG SZ 65 L12IN FNGR THK79MIL GRN LTX FREE

## (undated) DEVICE — DRESSING PETRO W3XL8IN OIL EMUL N ADH GZ KNIT IMPREG CELOS

## (undated) DEVICE — HI-TORQUE CONNECT GUIDE WIRE .018" 300 CM: Brand: HI-TORQUE CONNECT

## (undated) DEVICE — BLADE ES ELASTOMERIC COAT INSUL DURABLE BEND UPTO 90DEG

## (undated) DEVICE — SPONGE LAP W18XL18IN WHT COT 4 PLY FLD STRUNG RADPQ DISP ST

## (undated) DEVICE — GUIDEWIRE VASC L180CM DIA0.035IN TIP L5CM PTFE COAT S STL

## (undated) DEVICE — FOGARTY THRU-LUMEN EMBOLECTOMY CATHETER 6F 80CM: Brand: FOGARTY

## (undated) DEVICE — CHLORAPREP 26ML ORANGE

## (undated) DEVICE — FOGARTY - HYDRAGRIP SURGICAL - CLAMP INSERTS: Brand: FOGARTY SOFTJAW

## (undated) DEVICE — SUTURE ETHLN SZ 3-0 L18IN NONABSORBABLE BLK L24MM FS-1 3/8 663G

## (undated) DEVICE — RADIFOCUS TORQUE DEVICE MULTI-TORQUE VISE: Brand: RADIFOCUS TORQUE DEVICE

## (undated) DEVICE — RADIFOCUS GLIDEWIRE: Brand: GLIDEWIRE

## (undated) DEVICE — GAUZE,SPONGE,FLUFF,6"X6.75",STRL,5/TRAY: Brand: MEDLINE

## (undated) DEVICE — GLOVE SURG SZ 65 L12IN FNGR THK87MIL WHT LTX FREE

## (undated) DEVICE — SYRINGE IRRIG 60ML SFT PLIABLE BLB EZ TO GRP 1 HND USE W/

## (undated) DEVICE — PAD,ABDOMINAL,5"X9",ST,LF,25/BX: Brand: MEDLINE INDUSTRIES, INC.

## (undated) DEVICE — TAPE MED W1/8XL30IN WHT POLY

## (undated) DEVICE — BANDAGE,GAUZE,BULKEE II,4.5"X4.1YD,STRL: Brand: MEDLINE

## (undated) DEVICE — DRAPE C ARM UNIV W41XL74IN CLR PLAS XR VELC CLSR POLY STRP

## (undated) DEVICE — RADIFOCUS GLIDECATH: Brand: GLIDECATH

## (undated) DEVICE — GLOVE SURG SZ 75 L12IN FNGR THK87MIL WHT LTX FREE

## (undated) DEVICE — SUTURE N ABSRB MONOFILAMENT 5-0 CV-5 TH-18 36 IN GORTX 5N04A

## (undated) DEVICE — GOWN,AURORA,NONREINFORCED,LARGE: Brand: MEDLINE

## (undated) DEVICE — SUTURE PROL SZ 5-0 L36IN NONABSORBABLE BLU L13MM C-1 3/8 8720H